# Patient Record
Sex: FEMALE | Race: WHITE | NOT HISPANIC OR LATINO | Employment: FULL TIME | URBAN - METROPOLITAN AREA
[De-identification: names, ages, dates, MRNs, and addresses within clinical notes are randomized per-mention and may not be internally consistent; named-entity substitution may affect disease eponyms.]

---

## 2017-01-03 ENCOUNTER — APPOINTMENT (OUTPATIENT)
Dept: SURGERY | Facility: CLINIC | Age: 32
End: 2017-01-03

## 2017-01-08 ENCOUNTER — ANESTHESIA EVENT (OUTPATIENT)
Dept: SURGERY | Facility: CLINIC | Age: 32
End: 2017-01-08
Payer: COMMERCIAL

## 2017-01-09 ENCOUNTER — ANESTHESIA (OUTPATIENT)
Dept: SURGERY | Facility: CLINIC | Age: 32
End: 2017-01-09
Payer: COMMERCIAL

## 2017-01-09 ENCOUNTER — APPOINTMENT (OUTPATIENT)
Dept: CARDIOLOGY | Facility: CLINIC | Age: 32
End: 2017-01-09
Payer: COMMERCIAL

## 2017-01-09 PROBLEM — I49.3 PVC'S (PREMATURE VENTRICULAR CONTRACTIONS): Status: ACTIVE | Noted: 2017-01-09

## 2017-01-09 PROCEDURE — 25000125 ZZHC RX 250: Performed by: NURSE ANESTHETIST, CERTIFIED REGISTERED

## 2017-01-09 PROCEDURE — 93620 COMP EP EVL R AT VEN PAC&REC: CPT

## 2017-01-09 PROCEDURE — C1730 CATH, EP, 19 OR FEW ELECT: HCPCS

## 2017-01-09 PROCEDURE — 25800025 ZZH RX 258: Performed by: NURSE ANESTHETIST, CERTIFIED REGISTERED

## 2017-01-09 PROCEDURE — 93621 COMP EP EVL L PAC&REC C SINS: CPT

## 2017-01-09 PROCEDURE — 93602 INTRA-ATRIAL RECORDING: CPT

## 2017-01-09 RX ORDER — SODIUM CHLORIDE, SODIUM LACTATE, POTASSIUM CHLORIDE, CALCIUM CHLORIDE 600; 310; 30; 20 MG/100ML; MG/100ML; MG/100ML; MG/100ML
INJECTION, SOLUTION INTRAVENOUS CONTINUOUS PRN
Status: DISCONTINUED | OUTPATIENT
Start: 2017-01-09 | End: 2017-01-09

## 2017-01-09 RX ORDER — FENTANYL CITRATE 50 UG/ML
INJECTION, SOLUTION INTRAMUSCULAR; INTRAVENOUS PRN
Status: DISCONTINUED | OUTPATIENT
Start: 2017-01-09 | End: 2017-01-09

## 2017-01-09 RX ORDER — PROPOFOL 10 MG/ML
INJECTION, EMULSION INTRAVENOUS CONTINUOUS PRN
Status: DISCONTINUED | OUTPATIENT
Start: 2017-01-09 | End: 2017-01-09

## 2017-01-09 RX ORDER — CEFAZOLIN SODIUM 1 G/3ML
INJECTION, POWDER, FOR SOLUTION INTRAMUSCULAR; INTRAVENOUS PRN
Status: DISCONTINUED | OUTPATIENT
Start: 2017-01-09 | End: 2017-01-09

## 2017-01-09 RX ADMIN — PROPOFOL 50 MCG/KG/MIN: 10 INJECTION, EMULSION INTRAVENOUS at 07:55

## 2017-01-09 RX ADMIN — CEFAZOLIN 1 G: 1 INJECTION, POWDER, FOR SOLUTION INTRAMUSCULAR; INTRAVENOUS at 08:18

## 2017-01-09 RX ADMIN — PHENYLEPHRINE HYDROCHLORIDE 100 MCG: 10 INJECTION, SOLUTION INTRAMUSCULAR; INTRAVENOUS; SUBCUTANEOUS at 08:46

## 2017-01-09 RX ADMIN — MIDAZOLAM HYDROCHLORIDE 0.5 MG: 1 INJECTION, SOLUTION INTRAMUSCULAR; INTRAVENOUS at 08:24

## 2017-01-09 RX ADMIN — PHENYLEPHRINE HYDROCHLORIDE 100 MCG: 10 INJECTION, SOLUTION INTRAMUSCULAR; INTRAVENOUS; SUBCUTANEOUS at 09:48

## 2017-01-09 RX ADMIN — MIDAZOLAM HYDROCHLORIDE 2 MG: 1 INJECTION, SOLUTION INTRAMUSCULAR; INTRAVENOUS at 07:44

## 2017-01-09 RX ADMIN — FENTANYL CITRATE 25 MCG: 50 INJECTION, SOLUTION INTRAMUSCULAR; INTRAVENOUS at 08:17

## 2017-01-09 RX ADMIN — MIDAZOLAM HYDROCHLORIDE 1 MG: 1 INJECTION, SOLUTION INTRAMUSCULAR; INTRAVENOUS at 07:57

## 2017-01-09 RX ADMIN — FENTANYL CITRATE 25 MCG: 50 INJECTION, SOLUTION INTRAMUSCULAR; INTRAVENOUS at 08:20

## 2017-01-09 RX ADMIN — PHENYLEPHRINE HYDROCHLORIDE 100 MCG: 10 INJECTION, SOLUTION INTRAMUSCULAR; INTRAVENOUS; SUBCUTANEOUS at 09:34

## 2017-01-09 RX ADMIN — SODIUM CHLORIDE, POTASSIUM CHLORIDE, SODIUM LACTATE AND CALCIUM CHLORIDE: 600; 310; 30; 20 INJECTION, SOLUTION INTRAVENOUS at 07:44

## 2017-01-09 ASSESSMENT — COPD QUESTIONNAIRES: COPD: 0

## 2017-01-09 ASSESSMENT — LIFESTYLE VARIABLES: TOBACCO_USE: 0

## 2017-01-09 NOTE — ANESTHESIA POSTPROCEDURE EVALUATION
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    Anesthesia Type:  MAC    Note:  Anesthesia Post Evaluation    Patient location during evaluation: PACU  Patient participation: Able to fully participate in evaluation  Level of consciousness: awake and alert  Pain management: adequate  Airway patency: patent  Cardiovascular status: acceptable  Respiratory status: acceptable  Hydration status: acceptable  PONV: none     Anesthetic complications: yes  Specific anesthetic complications: corneal abrasion    Comments: Patient complained of Right eye pain in PACU. R eye appeared somewhat reddened and tearing. No foreign body present on visual exam. Eye flushed with normal saline. Injury consistent with corneal abrasion.    Rx erythromycin ointment [id number] for 3 - 5 days. Discussed with patient that normally corneal abrasions resolved in 24 - 72 hours. If worsening pain, vision, new discharge from eye, no improvement after 3 days she should be seen by opthalmology.         Last vitals:  Filed Vitals:    01/09/17 1100 01/09/17 1115 01/09/17 1130   BP: 108/67 105/59 109/63   Pulse:      Temp:      Resp: 14 16 16   SpO2: 100%         Electronically Signed By: Lloyd Edwards MD  January 9, 2017  11:50 AM

## 2017-01-09 NOTE — ANESTHESIA CARE TRANSFER NOTE
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    Anesthesia Type:   MAC     Note:  Airway :Face Mask  Patient transferred to:PACU        Vitals: (Last set prior to Anesthesia Care Transfer)              Electronically Signed By: DESTINEE Grady CRNA  January 9, 2017  10:10 AM

## 2017-01-09 NOTE — ANESTHESIA PREPROCEDURE EVALUATION
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  > 6 hours solids, > 2 hours clear liquids.       Past Surgical History   Procedure Laterality Date     Surgical history of -   1986,age 13 ,age 15     for fallot tetralogyx4     Surgical history of -        eyelid     Cardiac surgery           Anesthesia Evaluation     . Pt has had prior anesthetic. Type: General    No history of anesthetic complications     ROS/MED HX    ENT/Pulmonary:      (-) tobacco use, asthma and COPD   Neurologic:      (-) CVA, TIA and Neuropathy   Cardiovascular:     (+) hypertension----. : . . . :. . 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  Ordering Provider: TONY EVERETT  Overall quality of the study: Good.                                                                        IMPRESSION:  1.  Normal coronary arteries, with normal origins and no CAD.  2.  The pulmonary artery conduit is circumferentially calcified and  stenotic at its distal anastomosis with the PA confluence, measuring  17.2 mm x 8.7 mm at its narrowest segment. The stenotic segment is  approximately 10 mm in length. The PA conduit measures 22.7 mm x 16.3  mm, proximal to the stenotic segment. The PA confluence distal to the  anastomosis measures 21.6 mm x 14.1 mm.    3. There are no coronary arteries in close  proximity to the stenotic  segment of the PA conduit.     5. Please review Radiology report for incidental noncardiac findings  that will follow separately         (-) CAD, irregular heartbeat/palpitations and stent   METS/Exercise Tolerance:  >4 METS   Hematologic:        (-) anemia   Musculoskeletal:         GI/Hepatic:        (-) GERD and liver disease   Renal/Genitourinary:      (-) renal disease   Endo:      (-) Type I DM, Type II DM and thyroid disease   Psychiatric:         Infectious Disease:  - neg infectious disease ROS       Malignancy:         Other:               Physical Exam  Normal systems: cardiovascular, pulmonary and dental    Airway   Mallampati: I  TM distance: >3 FB  Neck ROM: full    Dental     Cardiovascular   Rhythm and rate: regular and normal  (+) murmur       Pulmonary    breath sounds clear to auscultation                    Anesthesia Plan      History & Physical Review  History and physical reviewed and following examination; no interval change.    ASA Status:  3 .        Plan for MAC (with GA backup) with Intravenous induction. Maintenance will be TIVA.  Reason for MAC:  Chronic cardiopulmonary disease (G9)  PONV prophylaxis:  Ondansetron and Ondansetron (or other 5HT-3)       Postoperative Care  Postoperative pain management:  IV analgesics.      Consents  Anesthetic plan, risks, benefits and alternatives discussed with:  Patient..        Lloyd Edwards MD  7:25 AM January 9, 2017                       .

## 2017-01-25 ENCOUNTER — HOSPITAL ENCOUNTER (INPATIENT)
Facility: CLINIC | Age: 32
Setting detail: SURGERY ADMIT
End: 2017-01-25
Attending: THORACIC SURGERY (CARDIOTHORACIC VASCULAR SURGERY) | Admitting: THORACIC SURGERY (CARDIOTHORACIC VASCULAR SURGERY)
Payer: COMMERCIAL

## 2017-02-03 ENCOUNTER — OFFICE VISIT (OUTPATIENT)
Dept: FAMILY MEDICINE | Facility: CLINIC | Age: 32
End: 2017-02-03
Payer: COMMERCIAL

## 2017-02-03 VITALS
OXYGEN SATURATION: 99 % | TEMPERATURE: 98.6 F | HEART RATE: 77 BPM | WEIGHT: 130 LBS | SYSTOLIC BLOOD PRESSURE: 115 MMHG | BODY MASS INDEX: 23.03 KG/M2 | DIASTOLIC BLOOD PRESSURE: 76 MMHG

## 2017-02-03 DIAGNOSIS — R30.0 DYSURIA: Primary | ICD-10-CM

## 2017-02-03 LAB
ALBUMIN UR-MCNC: NEGATIVE MG/DL
APPEARANCE UR: ABNORMAL
BACTERIA #/AREA URNS HPF: ABNORMAL /HPF
BILIRUB UR QL STRIP: NEGATIVE
COLOR UR AUTO: YELLOW
GLUCOSE UR STRIP-MCNC: NEGATIVE MG/DL
HGB UR QL STRIP: ABNORMAL
KETONES UR STRIP-MCNC: NEGATIVE MG/DL
LEUKOCYTE ESTERASE UR QL STRIP: NEGATIVE
NITRATE UR QL: NEGATIVE
NON-SQ EPI CELLS #/AREA URNS LPF: ABNORMAL /LPF
PH UR STRIP: 7 PH (ref 5–7)
RBC #/AREA URNS AUTO: ABNORMAL /HPF (ref 0–2)
SP GR UR STRIP: <=1.005 (ref 1–1.03)
URN SPEC COLLECT METH UR: ABNORMAL
UROBILINOGEN UR STRIP-ACNC: 0.2 EU/DL (ref 0.2–1)
WBC #/AREA URNS AUTO: ABNORMAL /HPF (ref 0–2)

## 2017-02-03 PROCEDURE — 87088 URINE BACTERIA CULTURE: CPT | Performed by: FAMILY MEDICINE

## 2017-02-03 PROCEDURE — 87086 URINE CULTURE/COLONY COUNT: CPT | Performed by: FAMILY MEDICINE

## 2017-02-03 PROCEDURE — 87186 SC STD MICRODIL/AGAR DIL: CPT | Performed by: FAMILY MEDICINE

## 2017-02-03 PROCEDURE — 99213 OFFICE O/P EST LOW 20 MIN: CPT | Performed by: FAMILY MEDICINE

## 2017-02-03 PROCEDURE — 81001 URINALYSIS AUTO W/SCOPE: CPT | Performed by: FAMILY MEDICINE

## 2017-02-03 RX ORDER — CIPROFLOXACIN 250 MG/1
250 TABLET, FILM COATED ORAL 2 TIMES DAILY
Qty: 6 TABLET | Refills: 0 | Status: SHIPPED | OUTPATIENT
Start: 2017-02-03 | End: 2017-07-25

## 2017-02-03 NOTE — Clinical Note
[facility]   [address] [place]  [address]  [phone]                                   February 6, 2017    Lynne Smith  [address]  [facility] [address]        Dear Lynne,    Your urine culture was positive   It is sensitive to the antibiotic I put you on.    If problems call.    Results for orders placed or performed in visit on 02/03/17   UA reflex to Microscopic and Culture   Result Value Ref Range    Color Urine Yellow     Appearance Urine Slightly Cloudy     Glucose Urine Negative NEG mg/dL    Bilirubin Urine Negative NEG    Ketones Urine Negative NEG mg/dL    Specific Gravity Urine <=1.005 1.003 - 1.035    Blood Urine Trace (A) NEG    pH Urine 7.0 5.0 - 7.0 pH    Protein Albumin Urine Negative NEG mg/dL    Urobilinogen Urine 0.2 0.2 - 1.0 EU/dL    Nitrite Urine Negative NEG    Leukocyte Esterase Urine Negative NEG    Source Midstream Urine    Urine Microscopic   Result Value Ref Range    WBC Urine 2-5 (A) 0 - 2 /HPF    RBC Urine O - 2 0 - 2 /HPF    Squamous Epithelial /LPF Urine Few FEW /LPF    Bacteria Urine Moderate (A) NEG /HPF   Urine Culture Aerobic Bacterial   Result Value Ref Range    Specimen Description Midstream Urine     Culture Micro >100,000 colonies/mL Escherichia coli (A)     Micro Report Status FINAL 02/05/2017     Organism: >100,000 colonies/mL Escherichia coli        Susceptibility    >100,000 colonies/ml escherichia coli (maricruz) -  (no method available)     AMPICILLIN 8 Susceptible  ug/mL     CEFAZOLIN Value in next row  ug/mL      <=4 SusceptibleCefazolin MARICRUZ breakpoints are for the treatment of uncomplicated urinary tract infections.  For the treatment of systemic infections, please contact the laboratory for additional testing.     CEFOXITIN Value in next row  ug/mL      <=4 SusceptibleCefazolin MARICRUZ breakpoints are for the treatment of uncomplicated urinary tract infections.  For the treatment of systemic infections,  please contact the laboratory for additional testing.     CEFTAZIDIME Value in next row  ug/mL      <=4 SusceptibleCefazolin IZABELA breakpoints are for the treatment of uncomplicated urinary tract infections.  For the treatment of systemic infections, please contact the laboratory for additional testing.     CEFTRIAXONE Value in next row  ug/mL      <=4 SusceptibleCefazolin IZABELA breakpoints are for the treatment of uncomplicated urinary tract infections.  For the treatment of systemic infections, please contact the laboratory for additional testing.     CIPROFLOXACIN Value in next row  ug/mL      <=4 SusceptibleCefazolin IZABELA breakpoints are for the treatment of uncomplicated urinary tract infections.  For the treatment of systemic infections, please contact the laboratory for additional testing.     GENTAMICIN Value in next row  ug/mL      <=4 SusceptibleCefazolin IZABELA breakpoints are for the treatment of uncomplicated urinary tract infections.  For the treatment of systemic infections, please contact the laboratory for additional testing.     LEVOFLOXACIN Value in next row  ug/mL      <=4 SusceptibleCefazolin IZABELA breakpoints are for the treatment of uncomplicated urinary tract infections.  For the treatment of systemic infections, please contact the laboratory for additional testing.     NITROFURANTOIN Value in next row  ug/mL      <=4 SusceptibleCefazolin IZABELA breakpoints are for the treatment of uncomplicated urinary tract infections.  For the treatment of systemic infections, please contact the laboratory for additional testing.     TOBRAMYCIN Value in next row  ug/mL      <=4 SusceptibleCefazolin IZABELA breakpoints are for the treatment of uncomplicated urinary tract infections.  For the treatment of systemic infections, please contact the laboratory for additional testing.     Trimethoprim/Sulfa Value in next row  ug/mL      <=4 SusceptibleCefazolin IZABELA breakpoints are for the treatment of uncomplicated urinary tract  infections.  For the treatment of systemic infections, please contact the laboratory for additional testing.     AMPICILLIN/SULBACTAM Value in next row  ug/mL      <=4 SusceptibleCefazolin IZABELA breakpoints are for the treatment of uncomplicated urinary tract infections.  For the treatment of systemic infections, please contact the laboratory for additional testing.     Piperacillin/Tazo Value in next row  ug/mL      <=4 SusceptibleCefazolin IZABELA breakpoints are for the treatment of uncomplicated urinary tract infections.  For the treatment of systemic infections, please contact the laboratory for additional testing.     CEFEPIME Value in next row  ug/mL      <=4 SusceptibleCefazolin IZABELA breakpoints are for the treatment of uncomplicated urinary tract infections.  For the treatment of systemic infections, please contact the laboratory for additional testing.       If you have any questions please call the clinic at [phone]    Sincerely,    Italo Arnold MD  bmd

## 2017-02-03 NOTE — PROGRESS NOTES
SUBJECTIVE:                                                    Lynne Smiht is a 31 year old female who presents to clinic today for the following health issues:      URINARY TRACT SYMPTOMS     Onset: x 5 days    Description:   Painful urination (Dysuria): YES  Blood in urine (Hematuria): no   Delay in urine (Hesitency): YES    Intensity: mild    Progression of Symptoms:  worsening    Accompanying Signs & Symptoms:  Fever/chills: YES- on/off  Flank pain YES- Left alittle  Nausea and vomiting: no   Any vaginal symptoms: none, vaginal discharge -Little white, vaginal odor and vaginal itching  Abdominal/Pelvic Pain: no    History:   History of frequent UTI's: no   History of kidney stones: no   Sexually Active: YES  Possibility of pregnancy: No           Possible yeast infection?    Loss of appetite   Feels flushed   Needs to urinate always     Strong urine   Cloudy urine     No vaginal discharge     O: /76  Pulse 77  Temp 98.6  F (37  C) (Oral)  Wt 130 lb (59 kg)  LMP 12/23/2016  SpO2 99%  Breastfeeding? No  BMI 23.03 kg/m2    The abdomen is soft without tenderness, guarding, mass, rebound or organomegaly. Bowel sounds are normal. No CVA tenderness or inguinal adenopathy noted.    Mild suprapubic tenderness      ICD-10-CM    1. Dysuria R30.0 UA reflex to Microscopic and Culture     Urine Microscopic     ciprofloxacin (CIPRO) 250 MG tablet     Urine Culture Aerobic Bacterial     Urine is dilute and will run urine culture

## 2017-02-03 NOTE — MR AVS SNAPSHOT
After Visit Summary   2/3/2017    Lynne Smith    MRN: [id number]           Patient Information     Date Of Birth          1985        Visit Information        Provider Department      2/3/2017 3:20 PM Italo Arnold MD [facility]        Today's Diagnoses     Dysuria    -  1        Follow-ups after your visit        Your next 10 appointments already scheduled     Apr 07, 2017  9:00 AM   PRE-OP with ENRICO Ambrocio   Peds Cardiovascular Surgery ([facility])    [facility]  [address] [place] [id number] [phone]            Apr 07, 2017 10:20 AM   PRE-OP with MD Mao Tineo Cardiovascular Surgery ([facility])    [facility]  [address] [place] [address] [id number] [phone]            Apr 17, 2017   Procedure with Drew Irwin MD   [facility], Same Day Surgery (--)    [address] [place] [address] [id number] [phone]              Who to contact     If you have questions or need follow up information about today's clinic visit or your schedule please contact [facility] directly at [phone].  Normal or non-critical lab and imaging results will be communicated to you by MyChart, letter or phone within 4 business days after the clinic has received the results. If you do not hear from us within 7 days, please contact the clinic through MyChart or phone. If you have a critical or abnormal lab result, we will notify you by phone as soon as possible.  Submit refill requests through Record[employer] or call your pharmacy and they will forward the refill request to us. Please allow 3 business days for your refill to be completed.          Additional Information About Your Visit        [facility] Information     [employer] lets you send messages to your doctor, view your test results, renew your prescriptions, schedule appointments and more. To  "sign up, go to [email or web address] . Click on \"Log in\" on the left side of the screen, which will take you to the Welcome page. Then click on \"Sign up Now\" on the right side of the page.     You will be asked to enter the access code listed below, as well as some personal information. Please follow the directions to create your username and password.     Your access code is: [email or web address]  Expires: 2017  2:05 PM     Your access code will  in 90 days. If you need help or a new code, please call your [facility] clinic or [phone].        Care EveryWhere ID     This is your Care EveryWhere ID. This could be used by other organizations to access your [facility] medical records  [id number]        Your Vitals Were     Pulse Temperature Pulse Oximetry Breastfeeding?          77 98.6  F (37  C) (Oral) 99% No         Blood Pressure from Last 3 Encounters:   17 115/76   17 119/78   16 122/80    Weight from Last 3 Encounters:   17 130 lb (58.968 kg)   17 130 lb 8.2 oz (59.2 kg)   16 130 lb (58.968 kg)              We Performed the Following     UA reflex to Microscopic and Culture     Urine Culture Aerobic Bacterial     Urine Microscopic          Today's Medication Changes          These changes are accurate as of: 2/3/17  4:04 PM.  If you have any questions, ask your nurse or doctor.               Start taking these medicines.        Dose/Directions    ciprofloxacin 250 MG tablet   Commonly known as:  CIPRO   Used for:  Dysuria   Started by:  Italo Arnold MD        Dose:  250 mg   Take 1 tablet (250 mg) by mouth 2 times daily   Quantity:  6 tablet   Refills:  0            Where to get your medicines      These medications were sent to [facility] - [place], [place] - [address]  [address], [facility] [address]     Phone:  [phone]    - ciprofloxacin 250 MG tablet             Primary Care Provider Office Phone # " Fax #    Mel Dias PA-C [phone]       [facility] [address]  [facility] [address]        Thank you!     Thank you for choosing [facility]  for your care. Our goal is always to provide you with excellent care. Hearing back from our patients is one way we can continue to improve our services. Please take a few minutes to complete the written survey that you may receive in the mail after your visit with us. Thank you!             Your Updated Medication List - Protect others around you: Learn how to safely use, store and throw away your medicines at www.disposemymeds.org.          This list is accurate as of: 2/3/17  4:04 PM.  Always use your most recent med list.                   Brand Name Dispense Instructions for use    benazepril 10 MG tablet    LOTENSIN    90 tablet    Take 1 tablet (10 mg) by mouth daily One daily       ciprofloxacin 250 MG tablet    CIPRO    6 tablet    Take 1 tablet (250 mg) by mouth 2 times daily       erythromycin ophthalmic ointment    ROMYCIN    1 Tube    Place into the right eye every 6 hours       MULTIVITAMIN TABS   OR      1 TABLET DAILY       norethindrone 0.35 MG per tablet    EUGENE    84 tablet    Take 1 tablet (0.35 mg) by mouth daily

## 2017-02-03 NOTE — NURSING NOTE
"Chief Complaint   Patient presents with     Fever     Low grade        Initial /76 mmHg  Pulse 77  Temp(Src) 98.6  F (37  C) (Oral)  Wt 130 lb (58.968 kg)  SpO2 99%  Breastfeeding? No Estimated body mass index is 23.03 kg/(m^2) as calculated from the following:    Height as of 1/9/17: 5' 3\" (1.6 m).    Weight as of this encounter: 130 lb (58.968 kg).  BP completed using cuff size: froylan Abdul MA      "

## 2017-02-05 LAB
BACTERIA SPEC CULT: ABNORMAL
MICRO REPORT STATUS: ABNORMAL
MICROORGANISM SPEC CULT: ABNORMAL
SPECIMEN SOURCE: ABNORMAL

## 2017-02-06 ENCOUNTER — TELEPHONE (OUTPATIENT)
Dept: FAMILY MEDICINE | Facility: CLINIC | Age: 32
End: 2017-02-06

## 2017-02-06 NOTE — TELEPHONE ENCOUNTER
Called patient at [phone] (home) in regards to message below from provider. Unable to reach, left a VM to call back to RN triage line.     Fernanda Grover RN  [facility]

## 2017-02-06 NOTE — TELEPHONE ENCOUNTER
Called patient notify her of message below from provider. Patient states she picked up the Cipro and started it on Friday. Her last dose was today. Patient states the Cipro did help and she is not having any lingering UTI symptoms.     Routed to provider as MARIA DEL CARMEN Grover RN  [facility]

## 2017-02-06 NOTE — TELEPHONE ENCOUNTER
Patient had uti (despite fairly normal looking urine ).   The antibiotic I placed her on was correct and she should be better.  Please call to confirm.

## 2017-02-06 NOTE — PROGRESS NOTES
Quick Note:    Your urine culture was positive   It is sensitive to the antibiotic I put you on.   If problems call.  ______

## 2017-04-27 ENCOUNTER — TRANSFERRED RECORDS (OUTPATIENT)
Dept: HEALTH INFORMATION MANAGEMENT | Facility: CLINIC | Age: 32
End: 2017-04-27

## 2017-04-27 LAB
ALBUMIN SERPL-MCNC: 5.1 G/DL
ALP SERPL-CCNC: NORMAL U/L
ALT SERPL-CCNC: NORMAL U/L
ANION GAP SERPL CALCULATED.3IONS-SCNC: NORMAL MMOL/L
AST SERPL-CCNC: 20 U/L
BASOPHILS NFR BLD AUTO: 0.03 %
BILIRUB SERPL-MCNC: NORMAL MG/DL
BUN SERPL-MCNC: 13 MG/DL
CALCIUM SERPL-MCNC: NORMAL MG/DL
CHLORIDE SERPLBLD-SCNC: NORMAL MMOL/L
CHOLEST SERPL-MCNC: 146 MG/DL
CO2 SERPL-SCNC: NORMAL MMOL/L
CREAT SERPL-MCNC: 0.8 MG/DL
EJECTION FRACTION: 58
EOSINOPHIL NFR BLD AUTO: 0.17 %
ERYTHROCYTE [DISTWIDTH] IN BLOOD BY AUTOMATED COUNT: 13.7 %
GFR SERPL CREATININE-BSD FRML MDRD: >60 ML/MIN/1.73M2
GLUCOSE SERPL-MCNC: 91 MG/DL (ref 70–99)
HCT VFR BLD AUTO: 43.9 %
HDLC SERPL-MCNC: 73 MG/DL
HEMOGLOBIN: 14.8 G/DL (ref 11.7–15.7)
LDLC SERPL CALC-MCNC: 58 MG/DL
LYMPHOCYTES NFR BLD AUTO: 2.63 %
MCH RBC QN AUTO: NORMAL PG
MCHC RBC AUTO-ENTMCNC: NORMAL G/DL
MCV RBC AUTO: 84.6 FL
MONOCYTES NFR BLD AUTO: 0.67 %
NEUTROPHILS NFR BLD AUTO: 6.7 %
NONHDLC SERPL-MCNC: 73 MG/DL
PLATELET COUNT - QUEST: 317 10^9/L (ref 150–450)
POTASSIUM SERPL-SCNC: 4.9 MMOL/L
PROT SERPL-MCNC: 7.6 G/DL
RBC # BLD AUTO: NORMAL 10^12/L
SODIUM SERPL-SCNC: 141 MMOL/L
TRIGL SERPL-MCNC: 74 MG/DL
TSH SERPL-ACNC: 2.8 MCU/ML
WBC # BLD AUTO: NORMAL 10^9/L

## 2017-05-04 ENCOUNTER — TRANSFERRED RECORDS (OUTPATIENT)
Dept: HEALTH INFORMATION MANAGEMENT | Facility: CLINIC | Age: 32
End: 2017-05-04

## 2017-05-12 DIAGNOSIS — I10 HYPERTENSION GOAL BP (BLOOD PRESSURE) < 140/90: ICD-10-CM

## 2017-05-12 RX ORDER — BENAZEPRIL HYDROCHLORIDE 10 MG/1
TABLET ORAL
Qty: 90 TABLET | Refills: 1 | Status: SHIPPED | OUTPATIENT
Start: 2017-05-12 | End: 2017-07-25

## 2017-05-12 NOTE — TELEPHONE ENCOUNTER
benazepril (LOTENSIN) 10 MG tablet      Last Written Prescription Date: 11-15-16  Last Fill Quantity: 90, # refills: 1  Last Office Visit with G, P or [facility] prescribing provider: 2-3-17       Potassium   Date Value Ref Range Status   01/09/2017 3.5 3.4 - 5.3 mmol/L Final     Creatinine   Date Value Ref Range Status   01/09/2017 0.64 0.52 - 1.04 mg/dL Final     BP Readings from Last 3 Encounters:   02/03/17 115/76   01/09/17 119/78   12/14/16 122/80

## 2017-05-12 NOTE — TELEPHONE ENCOUNTER
Prescription approved per [facility] Refill Protocol.    Alexa Roche RN  [facility]

## 2017-05-30 ENCOUNTER — CARE COORDINATION (OUTPATIENT)
Dept: CARDIOLOGY | Facility: CLINIC | Age: 32
End: 2017-05-30

## 2017-06-19 ENCOUNTER — TRANSFERRED RECORDS (OUTPATIENT)
Dept: HEALTH INFORMATION MANAGEMENT | Facility: CLINIC | Age: 32
End: 2017-06-19

## 2017-06-23 ENCOUNTER — TRANSFERRED RECORDS (OUTPATIENT)
Dept: HEALTH INFORMATION MANAGEMENT | Facility: CLINIC | Age: 32
End: 2017-06-23

## 2017-06-23 LAB — EJECTION FRACTION: 60

## 2017-06-28 LAB — INR PPP: 1.32 (ref 0.8–1)

## 2017-07-11 ENCOUNTER — CARE COORDINATION (OUTPATIENT)
Dept: CARDIOLOGY | Facility: CLINIC | Age: 32
End: 2017-07-11

## 2017-07-11 DIAGNOSIS — Z95.2 HX OF ARTIFICIAL HEART VALVE REPLACEMENT: Primary | ICD-10-CM

## 2017-07-13 ENCOUNTER — TRANSFERRED RECORDS (OUTPATIENT)
Dept: HEALTH INFORMATION MANAGEMENT | Facility: CLINIC | Age: 32
End: 2017-07-13

## 2017-07-13 LAB
CREAT SERPL-MCNC: 0.7 MG/DL (ref 0.55–1.02)
CREAT SERPL-MCNC: 0.7 MG/DL (ref 0.55–1.02)
GFR SERPL CREATININE-BSD FRML MDRD: 97.6 MIN/HR
GFR SERPL CREATININE-BSD FRML MDRD: 97.6 MIN/HR
GLUCOSE SERPL-MCNC: 105 MG/DL (ref 74–106)
GLUCOSE SERPL-MCNC: 105 MG/DL (ref 74–106)
POTASSIUM SERPL-SCNC: 4.3 MMOL/L (ref 3.5–5.1)
POTASSIUM SERPL-SCNC: 4.3 MMOL/L (ref 3.5–5.1)
TSH SERPL-ACNC: 1.52 MIU/ML (ref 0.35–4.94)

## 2017-07-17 ENCOUNTER — TELEPHONE (OUTPATIENT)
Dept: FAMILY MEDICINE | Facility: CLINIC | Age: 32
End: 2017-07-17

## 2017-07-17 ENCOUNTER — MEDICAL CORRESPONDENCE (OUTPATIENT)
Dept: HEALTH INFORMATION MANAGEMENT | Facility: CLINIC | Age: 32
End: 2017-07-17

## 2017-07-17 NOTE — TELEPHONE ENCOUNTER
Forms received from: [facility]   Phone number listed: [phone]   Fax listed: [phone]  Date received: 7-17-17  Form description: cardiac rehab  Once forms are completed, please return to [facility] via fax.  Is patient requesting to be contacted when forms are completed: n/a  Form placed: provider danni Jeter

## 2017-07-25 ENCOUNTER — OFFICE VISIT (OUTPATIENT)
Dept: FAMILY MEDICINE | Facility: CLINIC | Age: 32
End: 2017-07-25
Payer: COMMERCIAL

## 2017-07-25 VITALS
WEIGHT: 135 LBS | DIASTOLIC BLOOD PRESSURE: 65 MMHG | TEMPERATURE: 98.9 F | BODY MASS INDEX: 23.92 KG/M2 | HEIGHT: 63 IN | OXYGEN SATURATION: 100 % | SYSTOLIC BLOOD PRESSURE: 100 MMHG | HEART RATE: 61 BPM

## 2017-07-25 DIAGNOSIS — D62 ANEMIA DUE TO BLOOD LOSS, ACUTE: Primary | ICD-10-CM

## 2017-07-25 DIAGNOSIS — Z95.2 S/P PULMONARY VALVE REPLACEMENT: ICD-10-CM

## 2017-07-25 DIAGNOSIS — I10 HYPERTENSION GOAL BP (BLOOD PRESSURE) < 140/90: ICD-10-CM

## 2017-07-25 LAB
ERYTHROCYTE [DISTWIDTH] IN BLOOD BY AUTOMATED COUNT: 14 % (ref 10–15)
FERRITIN SERPL-MCNC: 24 NG/ML (ref 12–150)
HCT VFR BLD AUTO: 33.8 % (ref 35–47)
HGB BLD-MCNC: 10.6 G/DL (ref 11.7–15.7)
INR PPP: 1.88 (ref 0.86–1.14)
IRON SATN MFR SERPL: 5 % (ref 15–46)
IRON SERPL-MCNC: 20 UG/DL (ref 35–180)
MCH RBC QN AUTO: 25.7 PG (ref 26.5–33)
MCHC RBC AUTO-ENTMCNC: 31.4 G/DL (ref 31.5–36.5)
MCV RBC AUTO: 82 FL (ref 78–100)
PLATELET # BLD AUTO: 394 10E9/L (ref 150–450)
RBC # BLD AUTO: 4.13 10E12/L (ref 3.8–5.2)
TIBC SERPL-MCNC: 444 UG/DL (ref 240–430)
WBC # BLD AUTO: 9.8 10E9/L (ref 4–11)

## 2017-07-25 PROCEDURE — 85027 COMPLETE CBC AUTOMATED: CPT | Performed by: PHYSICIAN ASSISTANT

## 2017-07-25 PROCEDURE — 82728 ASSAY OF FERRITIN: CPT | Performed by: PHYSICIAN ASSISTANT

## 2017-07-25 PROCEDURE — 99214 OFFICE O/P EST MOD 30 MIN: CPT | Performed by: PHYSICIAN ASSISTANT

## 2017-07-25 PROCEDURE — 85610 PROTHROMBIN TIME: CPT | Performed by: PHYSICIAN ASSISTANT

## 2017-07-25 PROCEDURE — 83550 IRON BINDING TEST: CPT | Performed by: PHYSICIAN ASSISTANT

## 2017-07-25 PROCEDURE — 36415 COLL VENOUS BLD VENIPUNCTURE: CPT | Performed by: PHYSICIAN ASSISTANT

## 2017-07-25 PROCEDURE — 83540 ASSAY OF IRON: CPT | Performed by: PHYSICIAN ASSISTANT

## 2017-07-25 RX ORDER — METOPROLOL TARTRATE 25 MG/1
25 TABLET, FILM COATED ORAL 2 TIMES DAILY
Qty: 180 TABLET | Refills: 1 | Status: SHIPPED | OUTPATIENT
Start: 2017-07-25 | End: 2017-12-20

## 2017-07-25 RX ORDER — OXYCODONE HYDROCHLORIDE 5 MG/1
TABLET ORAL
COMMUNITY
End: 2017-07-25

## 2017-07-25 RX ORDER — POTASSIUM CHLORIDE 750 MG/1
TABLET, EXTENDED RELEASE ORAL
COMMUNITY
Start: 2017-06-24 | End: 2017-07-25

## 2017-07-25 RX ORDER — AMOXICILLIN 250 MG
CAPSULE ORAL
COMMUNITY
End: 2017-08-08

## 2017-07-25 RX ORDER — POLYETHYLENE GLYCOL 3350 17 G/17G
POWDER, FOR SOLUTION ORAL
COMMUNITY
End: 2017-08-08

## 2017-07-25 RX ORDER — METOPROLOL TARTRATE 25 MG/1
25 TABLET, FILM COATED ORAL 2 TIMES DAILY
COMMUNITY
End: 2017-12-20

## 2017-07-25 RX ORDER — ASPIRIN 81 MG/1
81 TABLET, CHEWABLE ORAL DAILY
COMMUNITY

## 2017-07-25 RX ORDER — WARFARIN SODIUM 1 MG/1
TABLET ORAL
COMMUNITY
Start: 2017-07-07 | End: 2017-07-28

## 2017-07-25 RX ORDER — TRAMADOL HYDROCHLORIDE 50 MG/1
TABLET ORAL
Refills: 0 | COMMUNITY
Start: 2017-07-13 | End: 2018-12-28

## 2017-07-25 RX ORDER — FOLIC ACID 0.8 MG
TABLET ORAL DAILY
COMMUNITY
End: 2017-08-08

## 2017-07-25 NOTE — PROGRESS NOTES
SUBJECTIVE:                                                    Lynne Smith is a 32 year old female who presents to clinic today for the following health issues:      Hospital Follow-up Visit:    Hospital/Nursing Home/IP Rehab Facility: [facility]  Date of Admission: 6/19/17  Date of Discharge: 6/24/17  Reason(s) for Admission: Heart surgery. Pulmonary valve was put int            Problems taking medications regularly:  None       Medication changes since discharge: Blood thinner was given       Problems adhering to non-medication therapy:  None    Patient would also like a letter stating that she can go back to work and set up for INR    Summary of hospitalization:  Discharge summary unavailable  Diagnostic Tests/Treatments reviewed.  Follow up needed: Cardiac rehab  Other Healthcare Providers Involved in Patient s Care:         None  Update since discharge: improved.     Post Discharge Medication Reconciliation: discharge medications reconciled, continue medications without change.  Plan of care communicated with patient     Coding guidelines for this visit:  Type of Medical   Decision Making Face-to-Face Visit       within 7 Days of discharge Face-to-Face Visit        within 14 days of discharge   Moderate Complexity [id number]   High Complexity [id number]          Put a pig valve in the pulmonary valve.   Cleaned up scar tissue. Reinforced the patches.   Did a cardiac ablation as well. Were tryign to prevent a defib.   Going back in November.   Has been feeling well. It is hard to take a deep breath. She is using the incetive spirometer.   Sneezing hurts a little. Was anemic for awhile. Still taking- has 10 tablets left. Last checked 3 weeks ago and was improving.   On coumadin for 3 months.   Starting cardio rehab    Back to work on 7/31/17.     Problem list and histories reviewed & adjusted, as indicated.  Additional history: as documented    Reviewed and updated as needed this visit by  "clinical staff  Tobacco  Allergies  Meds  Med Hx  Surg Hx  Fam Hx  Soc Hx      Reviewed and updated as needed this visit by Provider         ROS:  Constitutional, HEENT, cardiovascular, pulmonary, gi and gu systems are negative, except as otherwise noted.      OBJECTIVE:   /65 (BP Location: Left arm, Patient Position: Chair, Cuff Size: Adult Regular)  Pulse 61  Temp 98.9  F (37.2  C) (Oral)  Ht 5' 3.19\" (1.605 m)  Wt 135 lb (61.2 kg)  SpO2 100%  BMI 23.77 kg/m2  Body mass index is 23.77 kg/(m^2).  GENERAL: healthy, alert and no distress  CV: regular rate and rhythm, holosystolic murmur, no S3 or S4, no murmur, click or rub, no peripheral edema     Diagnostic Test Results:  none     ASSESSMENT/PLAN:       ICD-10-CM    1. Anemia due to blood loss, acute D62 Ferritin     Iron and iron binding capacity     CBC with platelets   2. S/P pulmonary valve replacement Z95.2 INR CLINIC REFERRAL     metoprolol (LOPRESSOR) 25 MG tablet     INR   3. Hypertension goal BP (blood pressure) < 140/90 I10    Recheck on anemia to determine how long she will need the iron.   INR today and referral to INR nurse.   Continue with same medications.   Follow with cardiac rehab guidelines.   Requested the discharge summary from [place].     FUTURE APPOINTMENTS:       - Follow-up office 3 months    Mel Dias PA-C  [facility]  "

## 2017-07-25 NOTE — LETTER
[address] [address]  Phone: [phone]  Fax: [phone]    July 25, 2017        Lynne Smith  [address] [address]          To whom it may concern:    RE: Lynne Smith    Patient was seen and treated today at our clinic. Patient can return to work on 7/31/17 without restrictions. However, patient will be participating in cardiac rehabilitation 3 days per week and on these days may need to leave early depending on how she is feeling and her symptoms.     Please contact me for questions or concerns.      Sincerely,        Mel Dias PA-C

## 2017-07-25 NOTE — MR AVS SNAPSHOT
After Visit Summary   7/25/2017    Lynne Smith    MRN: [id number]           Patient Information     Date Of Birth          1985        Visit Information        Provider Department      7/25/2017 11:00 AM Mel Dias PA-C [facility]        Today's Diagnoses     Anemia due to blood loss, acute    -  1    S/P pulmonary valve replacement           Follow-ups after your visit        Additional Services     INR CLINIC REFERRAL       Your provider has referred you to INR Services.    Please be aware that coverage of these services is subject to the terms and limitations of your health insurance plan.  Call member services at your health plan with any benefit or coverage questions.    Indication for Anticoagulation: Bioprosthetic Valve  If nonstandard INR is desired, indicate goal range and explanation: 2-2.5  Expected Duration of Therapy: 3 Months                  Your next 10 appointments already scheduled     Aug 01, 2017  7:20 AM CDT   Anticoagulation Visit with CP ANTI COAG   [facility] ([facility]    [address] [place] [address] [id number] [phone]              Who to contact     If you have questions or need follow up information about today's clinic visit or your schedule please contact [facility] directly at [phone].  Normal or non-critical lab and imaging results will be communicated to you by MyChart, letter or phone within 4 business days after the clinic has received the results. If you do not hear from us within 7 days, please contact the clinic through MyChart or phone. If you have a critical or abnormal lab result, we will notify you by phone as soon as possible.  Submit refill requests through [employer] [facility] or call your pharmacy and they will forward the refill request to us. Please allow 3 business days for your refill to be completed.          Additional  "Information About Your Visit        MyChart Information     [employer] lets you send messages to your doctor, view your test results, renew your prescriptions, schedule appointments and more. To sign up, go to [email or web address] . Click on \"Log in\" on the left side of the screen, which will take you to the Welcome page. Then click on \"Sign up Now\" on the right side of the page.     You will be asked to enter the access code listed below, as well as some personal information. Please follow the directions to create your username and password.     Your access code is: [email or web address]  Expires: 10/23/2017 11:41 AM     Your access code will  in 90 days. If you need help or a new code, please call your [place] clinic or [phone].        Care EveryWhere ID     This is your Care EveryWhere ID. This could be used by other organizations to access your [place] medical records  [id number]        Your Vitals Were     Pulse Temperature Height Pulse Oximetry BMI (Body Mass Index)       61 98.9  F (37.2  C) (Oral) 5' 3.19\" (1.605 m) 100% 23.77 kg/m2        Blood Pressure from Last 3 Encounters:   17 100/65   17 115/76   17 119/78    Weight from Last 3 Encounters:   17 135 lb (61.2 kg)   17 130 lb (59 kg)   17 130 lb 8.2 oz (59.2 kg)              We Performed the Following     CBC with platelets     Ferritin     INR CLINIC REFERRAL     INR     Iron and iron binding capacity          Today's Medication Changes          These changes are accurate as of: 17 11:41 AM.  If you have any questions, ask your nurse or doctor.               These medicines have changed or have updated prescriptions.        Dose/Directions    * metoprolol 25 MG tablet   Commonly known as:  LOPRESSOR   This may have changed:  Another medication with the same name was added. Make sure you understand how and when to take each.   Changed by:  Mel Dias PA-C        Dose:  25 mg   Take 25 mg by mouth 2 " times daily   Refills:  0       * metoprolol 25 MG tablet   Commonly known as:  LOPRESSOR   This may have changed:  You were already taking a medication with the same name, and this prescription was added. Make sure you understand how and when to take each.   Used for:  S/P pulmonary valve replacement   Changed by:  Mel Dias PA-C        Dose:  25 mg   Take 1 tablet (25 mg) by mouth 2 times daily   Quantity:  180 tablet   Refills:  1       * Notice:  This list has 2 medication(s) that are the same as other medications prescribed for you. Read the directions carefully, and ask your doctor or other care provider to review them with you.         Where to get your medicines      These medications were sent to [facility] [place]. \[facility].\"" [address]  [address] [facility] [address]     Phone:  [phone]     metoprolol 25 MG tablet                Primary Care Provider Office Phone # Fax #    Mel Dias PA-C [phone]       [facility] [address] [facility] [address]        Equal Access to Services     [facility]: Hadii aad ku hadasho Soomaali, waaxda luqadaha, qaybta kaalmada adeegyada, waxay ellyn hayjoce berry . So [facility] [phone].    ATENCIÓN: Si habla español, tiene a caldera disposición servicios gratuitos de asistencia lingüística. Llame al [phone].    We comply with applicable federal civil rights laws and [place] laws. We do not discriminate on the basis of race, color, national origin, age, disability sex, sexual orientation or gender identity.            Thank you!     Thank you for choosing [facility]  for your care. Our goal is always to provide you with excellent care. Hearing back from our patients is one way we can continue to improve our services. Please take a few minutes to complete the written survey that you may receive in the mail after your visit  with us. Thank you!             Your Updated Medication List - Protect others around you: Learn how to safely use, store and throw away your medicines at www.disposemymeds.org.          This list is accurate as of: 7/25/17 11:41 AM.  Always use your most recent med list.                   Brand Name Dispense Instructions for use Diagnosis    aspirin 81 MG chewable tablet      Take 81 mg by mouth daily        * metoprolol 25 MG tablet    LOPRESSOR     Take 25 mg by mouth 2 times daily        * metoprolol 25 MG tablet    LOPRESSOR    180 tablet    Take 1 tablet (25 mg) by mouth 2 times daily    S/P pulmonary valve replacement       MULTIVITAMIN TABS   OR      1 TABLET DAILY        norethindrone 0.35 MG per tablet    EUGENE    84 tablet    Take 1 tablet (0.35 mg) by mouth daily    Encounter for other contraceptive management       polyethylene glycol Packet    MIRALAX/GLYCOLAX          RA MAGNESIUM 500 MG Caps   Generic drug:  Magnesium      Take by mouth daily        senna-docusate 8.6-50 MG per tablet    SENOKOT-S;PERICOLACE          traMADol 50 MG tablet    ULTRAM     TAKE ONE TABLET BY MOUTH EVERY 6 HOURS AS NEEDED FOR moderate pain        warfarin 1 MG tablet    COUMADIN     Take 4 mg daily        * Notice:  This list has 2 medication(s) that are the same as other medications prescribed for you. Read the directions carefully, and ask your doctor or other care provider to review them with you.

## 2017-07-25 NOTE — NURSING NOTE
"Chief Complaint   Patient presents with     Surgical Followup     6/19/17 and letter saying she can go back to work and a set up for INR as she is on a blood thinner       Initial /65 (BP Location: Left arm, Patient Position: Chair, Cuff Size: Adult Regular)  Pulse 61  Temp 98.9  F (37.2  C) (Oral)  Ht 5' 3.19\" (1.605 m)  Wt 135 lb (61.2 kg)  SpO2 100%  BMI 23.77 kg/m2 Estimated body mass index is 23.77 kg/(m^2) as calculated from the following:    Height as of this encounter: 5' 3.19\" (1.605 m).    Weight as of this encounter: 135 lb (61.2 kg).  Medication Reconciliation: complete   Rebeca See IVETH Ingram      "

## 2017-07-26 ENCOUNTER — TELEPHONE (OUTPATIENT)
Dept: FAMILY MEDICINE | Facility: CLINIC | Age: 32
End: 2017-07-26

## 2017-07-26 NOTE — TELEPHONE ENCOUNTER
Please call patient.     Her INR is below goal at 1.8. I would like her to increase her warfarin to 5mg on Thursday and Sunday and continue with 4mg all other days and keep her appointment with Jena HUMMEL next week.   She is still anemic as well. Her hemoglobin is increasing, but her iron levels are still low. She should plan on being on her iron for another 3 months and we will recheck then. If she needs a refill we can send that to her pharmacy.   Mel Dias PA-C

## 2017-07-26 NOTE — TELEPHONE ENCOUNTER
Pt understands her med change for warfarin. Pt is taking over the counter Iron 65 mg and that is fine per Mel Dias. Pt notified. Anne Marie Horn RN-BSN.

## 2017-07-28 ENCOUNTER — TELEPHONE (OUTPATIENT)
Dept: FAMILY MEDICINE | Facility: CLINIC | Age: 32
End: 2017-07-28

## 2017-07-28 DIAGNOSIS — Z95.2 S/P PULMONARY VALVE REPLACEMENT: Primary | ICD-10-CM

## 2017-07-28 RX ORDER — WARFARIN SODIUM 1 MG/1
TABLET ORAL
Qty: 120 TABLET | Refills: 1 | Status: SHIPPED | OUTPATIENT
Start: 2017-07-28 | End: 2017-08-21 | Stop reason: DRUGHIGH

## 2017-07-28 NOTE — TELEPHONE ENCOUNTER
Reason for Call:  Medication or medication refill:    Do you use a [facility]?  Name of the pharmacy and phone number for the current request:  [facility]     Name of the medication requested: warfarin (COUMADIN) 1 MG tablet    Other request: patient forgot to as for a refill at 7-25-17 OV.  She will not have enough for the weekend.  Please call when done.    Can we leave a detailed message on this number? YES    Phone number patient can be reached at: Home number on file [phone] (home)    Best Time: any    Call taken on 7/28/2017 at 9:18 AM by Alesia Jeter    warfarin (COUMADIN) 1 MG tablet    Last Written Prescription Date: patient reported 7-7-17  Last Fill Qty: n/a, # refills: n/a  Last Office Visit with [facility], [facility] or [facility] prescribing provider: 7-25-17       Date and Result of Last PT/INR:   Lab Results   Component Value Date    INR 1.88 07/25/2017    INR 1.24 01/09/2017

## 2017-07-28 NOTE — TELEPHONE ENCOUNTER
coumadin    Last Written Prescription Date: 7/7/17  Last Fill Qty: -, # refills: -  Last Office Visit with [facility], [facility] or [facility] prescribing provider: 7/25/17 7/26/17  Note      Please call patient.      Her INR is below goal at 1.8. I would like her to increase her warfarin to 5mg on Thursday and Sunday and continue with 4mg all other days and keep her appointment with Jena HUMMEL next week.   She is still anemic as well. Her hemoglobin is increasing, but her iron levels are still low. She should plan on being on her iron for another 3 months and we will recheck then. If she needs a refill we can send that to her pharmacy.   Mel Dias PA-C          Next INR 8/1/17       Date and Result of Last PT/INR:   Lab Results   Component Value Date    INR 1.88 07/25/2017    INR 1.24 01/09/2017      Discussed with Mel Dias - Prescription approved per Mel.      Emerita Chavez RN  [facility]

## 2017-08-01 DIAGNOSIS — Z95.2 HX OF ARTIFICIAL HEART VALVE REPLACEMENT: ICD-10-CM

## 2017-08-01 LAB — INR PPP: 1.9 (ref 0.86–1.14)

## 2017-08-01 PROCEDURE — 36416 COLLJ CAPILLARY BLOOD SPEC: CPT | Performed by: INTERNAL MEDICINE

## 2017-08-01 PROCEDURE — 85610 PROTHROMBIN TIME: CPT | Performed by: INTERNAL MEDICINE

## 2017-08-02 ENCOUNTER — TELEPHONE (OUTPATIENT)
Dept: FAMILY MEDICINE | Facility: CLINIC | Age: 32
End: 2017-08-02

## 2017-08-02 NOTE — TELEPHONE ENCOUNTER
Called patient relayed below orders, patient repeated orders back correctly.    Emerita Chavez RN  [facility]

## 2017-08-02 NOTE — TELEPHONE ENCOUNTER
Reason for Call:  Other - Anticoagulation    Detailed comments: Patient had her INR drawn at the lab yesterday since INR nurse was not in. Patient has been waiting to find out if she needs to change her medication dose. She has not heard back yet. She asked if a nurse could call her to discuss dosing. Please leave a detailed voicemail if she does not answer.    Phone Number Patient can be reached at: Home number on file [phone] (home)    Best Time: Anytime    Can we leave a detailed message on this number? YES    Call taken on 8/2/2017 at 2:43 PM by Nelly Burciaga

## 2017-08-02 NOTE — TELEPHONE ENCOUNTER
INR = 1.9.    Take extra 4 mg once tonight and then resume same dose. ( 5 mg Thursday Sunday and 4 mg other days)  Re-check in 1 week

## 2017-08-02 NOTE — TELEPHONE ENCOUNTER
Routing to PCP to review and advise.     Emerita Chavez RN  [facility]

## 2017-08-02 NOTE — TELEPHONE ENCOUNTER
Who was/is supposed to follow up on his? Her INR is 1.9, so there is no urgency to this, but someone should get back to the patient with a plan for her. This could wait until tomorrow if need be, but please inform the patient of this.

## 2017-08-08 ENCOUNTER — ANTICOAGULATION THERAPY VISIT (OUTPATIENT)
Dept: NURSING | Facility: CLINIC | Age: 32
End: 2017-08-08
Payer: COMMERCIAL

## 2017-08-08 DIAGNOSIS — Z79.01 LONG-TERM (CURRENT) USE OF ANTICOAGULANTS: ICD-10-CM

## 2017-08-08 DIAGNOSIS — R52 MILD PAIN: Primary | ICD-10-CM

## 2017-08-08 LAB — INR POINT OF CARE: 2 (ref 0.86–1.14)

## 2017-08-08 PROCEDURE — 36416 COLLJ CAPILLARY BLOOD SPEC: CPT

## 2017-08-08 PROCEDURE — 99207 ZZC NO CHARGE NURSE ONLY: CPT

## 2017-08-08 PROCEDURE — 85610 PROTHROMBIN TIME: CPT | Mod: QW

## 2017-08-08 RX ORDER — ACETAMINOPHEN 500 MG
500-1000 TABLET ORAL EVERY 6 HOURS PRN
Qty: 30 TABLET | Refills: 0 | COMMUNITY
Start: 2017-08-08

## 2017-08-08 NOTE — MR AVS SNAPSHOT
Lynne Smith   8/8/2017 7:40 AM   Anticoagulation Therapy Visit    Description:  32 year old female   Provider:  JOSE MANUEL ANTI COAG   Department:  Cp Nurse           INR as of 8/8/2017     Today's INR 2.0      Anticoagulation Summary as of 8/8/2017     INR goal 2.0-3.0   Today's INR 2.0   Full instructions 4 mg on Mon, Wed, Fri; 5 mg all other days   Next INR check 8/15/2017    Indications   Long-term (current) use of anticoagulants [Z79.01] [Z79.01]         Your next Anticoagulation Clinic appointment(s)     Aug 15, 2017  7:20 AM CDT   Anticoagulation Visit with CP ANTI COAG   [facility] ([facility])    [address] [facility] [address] [address] [phone]              Contact Numbers     [facility]  Please call [phone] or [phone]  to cancel and/or reschedule your appointment.   Please call [phone] with any problems or questions regarding your therapy          August 2017 Details    Sun Mon Tue Wed Thu Fri Sat       1               2               3               4               5                 6               7               8      5 mg   See details      9      4 mg         10      5 mg         11      4 mg         12      5 mg           13      5 mg         14      4 mg         15            16               17               18               19                 20               21               22               23               24               25               26                 27               28               29               30               31                  Date Details   08/08 This INR check       Date of next INR:  8/15/2017         How to take your warfarin dose     To take:  4 mg Take 4 of the 1 mg tablets.    To take:  5 mg Take 5 of the 1 mg tablets.

## 2017-08-08 NOTE — PROGRESS NOTES
ANTICOAGULATION INITIAL CLINIC VISIT    Patient Name:  Lynne Smith  Date:  8/8/2017  Referred by: Mel HIRSCH  Contact Type:  Face to Face - this patient is new to INR Clinic today.  She reports she has been on Jantoven since 6-20-17.  She was started at [facility] with her surgery.  She shows me copies of INR results and Janotoven dosing.  She has never been in the INR Target range of 2.0 - 3.0.  Her INR's have been; 1.9, 1.3, 1.5, 1.8, and 1.5.  She reports she is currently not taking Magnesium, Miralax, Senna, Multivitamin since starting Jantoven.  She states her diet is mainly regi lettuce for greens because she adjusted it in the beginning when starting Jantoven.  She was issued 1 MG tablets by [facility].  She has a lot let, therefore we will use them up before changing her to a higher Jantoven strength.  We reviewed teaching materials below:    SUBJECTIVE:  Coumadin education was completed today.  Topics covered include:  -Introduction to coumadin  -Proper Administration  -INR Testing  -Sign/Symptoms of Bleeding  -Signs/Symptoms of Clot Formation or Stroke  -Dietary Intake of Vitamin K  -Drug Interactions  -Anticoagulation Identification (bracelet, necklace or wallet card)  -Future Surgery  -Effects of Alcohol, Tobacco, and Exercise on Coumadin    Coumadin Education Booklet and Coumadin Identification Wallet Card were given to the patient.       Patient Findings     Positives Initiation of therapy (June 20th started at [place])          OBJECTIVE    INR Protime   Date Value Ref Range Status   08/08/2017 2.0 (A) 0.86 - 1.14 Final       ASSESSMENT / PLAN  INR assessment THER    Recheck INR In: 1 WEEK    INR Location Clinic      Anticoagulation Summary as of 8/8/2017     INR goal 2.0-3.0   Today's INR 2.0   Maintenance plan 4 mg (1 mg x 4) on Mon, Wed, Fri; 5 mg (1 mg x 5) all other days   Full instructions 4 mg on Mon, Wed, Fri; 5 mg all other days   Weekly total 32 mg   Plan last modified Jena Soto RN  (8/8/2017)   Next INR check 8/15/2017   Target end date 9/29/2017    Indications   Long-term (current) use of anticoagulants [Z79.01] [Z79.01]         Anticoagulation Episode Summary     INR check location     Preferred lab     Send INR reminders to  ANTICOAG CLINIC    Comments       Anticoagulation Care Providers     Provider Role Specialty Phone number    Mel Dias PA-C  Physician Assistant - Medical [phone]            See the Encounter Report to view Anticoagulation Flowsheet and Dosing Calendar (Go to Encounters tab in chart review, and find the Anticoagulation Therapy Visit)    Dosage adjustment made based on physician directed care plan.    Jena Soto RN

## 2017-08-15 ENCOUNTER — ANTICOAGULATION THERAPY VISIT (OUTPATIENT)
Dept: NURSING | Facility: CLINIC | Age: 32
End: 2017-08-15
Payer: COMMERCIAL

## 2017-08-15 DIAGNOSIS — D64.9 ANEMIA: Primary | ICD-10-CM

## 2017-08-15 DIAGNOSIS — Z95.2 HEART VALVE REPLACED: ICD-10-CM

## 2017-08-15 DIAGNOSIS — Z79.01 LONG-TERM (CURRENT) USE OF ANTICOAGULANTS: ICD-10-CM

## 2017-08-15 LAB — INR POINT OF CARE: 1.7 (ref 0.86–1.14)

## 2017-08-15 PROCEDURE — 36416 COLLJ CAPILLARY BLOOD SPEC: CPT

## 2017-08-15 PROCEDURE — 99207 ZZC NO CHARGE NURSE ONLY: CPT

## 2017-08-15 PROCEDURE — 85610 PROTHROMBIN TIME: CPT | Mod: QW

## 2017-08-15 RX ORDER — FERROUS GLUCONATE 324(38)MG
TABLET ORAL
Qty: 100 TABLET | Refills: 1 | COMMUNITY
Start: 2017-08-15 | End: 2018-12-28

## 2017-08-15 NOTE — PROGRESS NOTES
ANTICOAGULATION FOLLOW-UP CLINIC VISIT    Patient Name:  Lynne Smith  Date:  8/15/2017  Contact Type:  Face to Face    SUBJECTIVE: Patient's INR is sub-therapeutic despite raising jantoven maintenance dose last visit.  Patient reports she started iron pills earlier this month and forgot to mention it last time.  Noted injury below.  Used tylenol the first day, but ice prn.  Denies bleeding or clotting symptoms.  States greens / veg intake was small.  Otherwise no other changes.  We will raise maintenance jantoven dose again this week.     Patient Findings     Positives Inflammation (twisted right ankle friday)           OBJECTIVE    INR Protime   Date Value Ref Range Status   08/15/2017 1.7 (A) 0.86 - 1.14 Final       ASSESSMENT / PLAN  INR assessment SUB    Recheck INR In: 1 WEEK    INR Location Clinic      Anticoagulation Summary as of 8/15/2017     INR goal 2.0-3.0   Today's INR 1.7!   Maintenance plan 5 mg (1 mg x 5) every day   Full instructions 8/15: 7 mg; Otherwise 5 mg every day   Weekly total 35 mg   Plan last modified Jena Soto, RN (8/15/2017)   Next INR check 8/22/2017   Target end date 9/29/2017    Indications   Long-term (current) use of anticoagulants [Z79.01] [Z79.01]  Heart valve replaced [Z95.2]         Anticoagulation Episode Summary     INR check location     Preferred lab     Send INR reminders to  ANTICO CLINIC    Comments       Anticoagulation Care Providers     Provider Role Specialty Phone number    Mel Dias PA-C  Physician Assistant - Medical [phone]            See the Encounter Report to view Anticoagulation Flowsheet and Dosing Calendar (Go to Encounters tab in chart review, and find the Anticoagulation Therapy Visit)    Dosage adjustment made based on physician directed care plan.    Jena Soto, SHAHEED

## 2017-08-15 NOTE — MR AVS SNAPSHOT
Lynne Smith   8/15/2017 7:20 AM   Anticoagulation Therapy Visit    Description:  32 year old female   Provider:  JOSE MANUEL ANTI COAG   Department:  Cp Nurse           INR as of 8/15/2017     Today's INR 1.7!      Anticoagulation Summary as of 8/15/2017     INR goal 2.0-3.0   Today's INR 1.7!   Full instructions 8/15: 7 mg; Otherwise 5 mg every day   Next INR check 8/22/2017    Indications   Long-term (current) use of anticoagulants [Z79.01] [Z79.01]  Heart valve replaced [Z95.2]         Your next Anticoagulation Clinic appointment(s)     Aug 22, 2017  8:40 AM CDT   Anticoagulation Visit with JOSE MANUEL ANTI AMINAH   [facility] ([facility]    [address] [address] [phone]              Contact Numbers     [facility]  Please call [phone] or [phone]  to cancel and/or reschedule your appointment.   Please call [phone] with any problems or questions regarding your therapy          August 2017 Details    Sun Mon Tue Wed Thu Fri Sat       1               2               3               4               5                 6               7               8               9               10               11               12                 13               14               15      7 mg   See details      16      5 mg         17      5 mg         18      5 mg         19      5 mg           20      5 mg         21      5 mg         22            23               24               25               26                 27               28               29               30               31                  Date Details   08/15 This INR check       Date of next INR:  8/22/2017         How to take your warfarin dose     To take:  5 mg Take 5 of the 1 mg tablets.    To take:  7 mg Take 7 of the 1 mg tablets.

## 2017-08-21 ENCOUNTER — ANTICOAGULATION THERAPY VISIT (OUTPATIENT)
Dept: NURSING | Facility: CLINIC | Age: 32
End: 2017-08-21
Payer: COMMERCIAL

## 2017-08-21 DIAGNOSIS — Z79.01 LONG-TERM (CURRENT) USE OF ANTICOAGULANTS: ICD-10-CM

## 2017-08-21 DIAGNOSIS — Z95.2 HEART VALVE REPLACED: ICD-10-CM

## 2017-08-21 LAB — INR POINT OF CARE: 2.1 (ref 0.86–1.14)

## 2017-08-21 PROCEDURE — 85610 PROTHROMBIN TIME: CPT | Mod: QW

## 2017-08-21 PROCEDURE — 99207 ZZC NO CHARGE NURSE ONLY: CPT

## 2017-08-21 PROCEDURE — 36416 COLLJ CAPILLARY BLOOD SPEC: CPT

## 2017-08-21 RX ORDER — WARFARIN SODIUM 5 MG/1
TABLET ORAL
Qty: 120 TABLET | Refills: 1 | Status: SHIPPED | OUTPATIENT
Start: 2017-08-21 | End: 2017-09-26

## 2017-08-21 NOTE — PROGRESS NOTES
ANTICOAGULATION FOLLOW-UP CLINIC VISIT    Patient Name:  Lynne Smith  Date:  8/21/2017  Contact Type:  Face to Face    SUBJECTIVE: Patient denies changes to other medications.  She had the 1mg warfarin tablets and we d/c that today and changed her to 5mg tablets because her dose has increased.  She denies bleeding or clotting symptoms.     Patient Findings     Positives No Problem Findings           OBJECTIVE    INR Protime   Date Value Ref Range Status   08/21/2017 2.1 (A) 0.86 - 1.14 Final       ASSESSMENT / PLAN  INR assessment THER    Recheck INR In: 1 WEEK    INR Location Clinic      Anticoagulation Summary as of 8/21/2017     INR goal 2.0-3.0   Today's INR 2.1   Maintenance plan 7.5 mg (5 mg x 1.5) on Tue; 5 mg (5 mg x 1) all other days   Full instructions 7.5 mg on Tue; 5 mg all other days   Weekly total 37.5 mg   Plan last modified Jena Soto RN (8/21/2017)   Next INR check 8/29/2017   Target end date 9/29/2017    Indications   Long-term (current) use of anticoagulants [Z79.01] [Z79.01]  Heart valve replaced [Z95.2]         Anticoagulation Episode Summary     INR check location     Preferred lab     Send INR reminders to [facility]    Comments       Anticoagulation Care Providers     Provider Role Specialty Phone number    Mel Dias, PA-C  Physician Assistant - Medical [phone]            See the Encounter Report to view Anticoagulation Flowsheet and Dosing Calendar (Go to Encounters tab in chart review, and find the Anticoagulation Therapy Visit)    Dosage adjustment made based on physician directed care plan.    Jena Soto RN

## 2017-08-21 NOTE — MR AVS SNAPSHOT
Lynne Smith   8/21/2017 8:40 AM   Anticoagulation Therapy Visit    Description:  32 year old female   Provider:  JOSE MANUEL ANTI COAG   Department:  Cp Nurse           INR as of 8/21/2017     Today's INR 2.1      Anticoagulation Summary as of 8/21/2017     INR goal 2.0-3.0   Today's INR 2.1   Full instructions 7.5 mg on Tue; 5 mg all other days   Next INR check 8/29/2017    Indications   Long-term (current) use of anticoagulants [Z79.01] [Z79.01]  Heart valve replaced [Z95.2]         Your next Anticoagulation Clinic appointment(s)     Aug 29, 2017  8:00 AM CDT   Anticoagulation Visit with JOSE MANUEL ANTI AMINAH   [facility] ([facility])    [address] [facility] [address] [phone]              Contact Numbers     [facility]  Please call [phone] or [phone]  to cancel and/or reschedule your appointment.   Please call [phone] with any problems or questions regarding your therapy          August 2017 Details    Sun Mon Tue Wed Thu Fri Sat       1               2               3               4               5                 6               7               8               9               10               11               12                 13               14               15               16               17               18               19                 20               21      5 mg   See details      22      7.5 mg         23      5 mg         24      5 mg         25      5 mg         26      5 mg           27      5 mg         28      5 mg         29            30               31                  Date Details   08/21 This INR check       Date of next INR:  8/29/2017         How to take your warfarin dose     To take:  5 mg Take 1 of the 5 mg tablets.    To take:  7.5 mg Take 1.5 of the 5 mg tablets.

## 2017-08-29 ENCOUNTER — ANTICOAGULATION THERAPY VISIT (OUTPATIENT)
Dept: NURSING | Facility: CLINIC | Age: 32
End: 2017-08-29
Payer: COMMERCIAL

## 2017-08-29 DIAGNOSIS — Z95.2 HEART VALVE REPLACED: ICD-10-CM

## 2017-08-29 DIAGNOSIS — Z79.01 LONG-TERM (CURRENT) USE OF ANTICOAGULANTS: ICD-10-CM

## 2017-08-29 LAB — INR POINT OF CARE: 2.2 (ref 0.86–1.14)

## 2017-08-29 PROCEDURE — 85610 PROTHROMBIN TIME: CPT | Mod: QW

## 2017-08-29 PROCEDURE — 99207 ZZC NO CHARGE NURSE ONLY: CPT

## 2017-08-29 PROCEDURE — 36416 COLLJ CAPILLARY BLOOD SPEC: CPT

## 2017-08-29 NOTE — MR AVS SNAPSHOT
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   Please call [phone] with any problems or questions regarding your therapy          August 2017 Details    Sun Mon Tue Wed Thu Fri Sat       1               2               3               4               5                 6               7               8               9               10               11               12                 13               14               15               16               17               18               19                 20               21               22               23               24               25               26                 27               28               29      7.5 mg   See details      30      5 mg         31      5 mg            Date Details   08/29 This INR check               How to take your warfarin dose     To take:  5 mg Take  1 of the 5 mg tablets.    To take:  7.5 mg Take 1.5 of the 5 mg tablets.           September 2017 Details    Sun Mon Tue Wed Thu Fri Sat          1      5 mg         2      5 mg           3      5 mg         4      5 mg         5      7.5 mg         6      5 mg         7      5 mg         8      5 mg         9      5 mg           10      5 mg         11      5 mg         12            13               14               15               16                 17               18               19               20               21               22               23                 24               25               26               27               28               29               30                Date Details   No additional details    Date of next INR:  9/12/2017         How to take your warfarin dose     To take:  5 mg Take 1 of the 5 mg tablets.    To take:  7.5 mg Take 1.5 of the 5 mg tablets.

## 2017-08-29 NOTE — PROGRESS NOTES
ANTICOAGULATION FOLLOW-UP CLINIC VISIT    Patient Name:  Lynne Smith  Date:  8/29/2017  Contact Type:  Face to Face    SUBJECTIVE: Patient reports she has her follow up at the [facility] 9-15-17 where she will get her final report and when to stop her warfarin.  She denies medicine or diet changes.  No symptoms of bleeding or clotting.       Patient Findings     Positives No Problem Findings           OBJECTIVE    INR Protime   Date Value Ref Range Status   08/29/2017 2.2 (A) 0.86 - 1.14 Final       ASSESSMENT / PLAN  INR assessment THER    Recheck INR In: 2 WEEKS    INR Location Clinic      Anticoagulation Summary as of 8/29/2017     INR goal 2.0-3.0   Today's INR 2.2   Maintenance plan 7.5 mg (5 mg x 1.5) on Tue; 5 mg (5 mg x 1) all other days   Full instructions 7.5 mg on Tue; 5 mg all other days   Weekly total 37.5 mg   No change documented Jena Soto RN   Plan last modified Jena Soto RN (8/21/2017)   Next INR check 9/12/2017   Target end date 9/29/2017    Indications   Long-term (current) use of anticoagulants [Z79.01] [Z79.01]  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    Jena Soto RN

## 2017-09-12 ENCOUNTER — ANTICOAGULATION THERAPY VISIT (OUTPATIENT)
Dept: NURSING | Facility: CLINIC | Age: 32
End: 2017-09-12
Payer: COMMERCIAL

## 2017-09-12 DIAGNOSIS — Z79.01 LONG-TERM (CURRENT) USE OF ANTICOAGULANTS: ICD-10-CM

## 2017-09-12 DIAGNOSIS — Z95.2 HEART VALVE REPLACED: ICD-10-CM

## 2017-09-12 LAB — INR POINT OF CARE: 2 (ref 0.86–1.14)

## 2017-09-12 PROCEDURE — 99207 ZZC NO CHARGE NURSE ONLY: CPT

## 2017-09-12 PROCEDURE — 36416 COLLJ CAPILLARY BLOOD SPEC: CPT

## 2017-09-12 PROCEDURE — 85610 PROTHROMBIN TIME: CPT | Mod: QW

## 2017-09-12 NOTE — PROGRESS NOTES
ANTICOAGULATION FOLLOW-UP CLINIC VISIT    Patient Name:  Lynne Smith  Date:  9/12/2017  Contact Type:  Face to Face    SUBJECTIVE: Patient to see Dr Tana Hammond at [facility] this Friday 9-15-17 in follow up to surgery.  Likely discontinue warfarin the end of this month.  Await further orders.     Patient Findings     Positives No Problem Findings           OBJECTIVE    INR Protime   Date Value Ref Range Status   09/12/2017 2.0 (A) 0.86 - 1.14 Final       ASSESSMENT / PLAN  INR assessment THER    Recheck INR In: 2 WEEKS    INR Location Clinic      Anticoagulation Summary as of 9/12/2017     INR goal 2.0-3.0   Today's INR 2.0   Maintenance plan 7.5 mg (5 mg x 1.5) on Tue; 5 mg (5 mg x 1) all other days   Full instructions 7.5 mg on Tue; 5 mg all other days   Weekly total 37.5 mg   No change documented Jena Soto RN   Plan last modified Jena Soto RN (8/21/2017)   Next INR check 9/26/2017   Target end date 9/29/2017    Indications   Long-term (current) use of anticoagulants [Z79.01] [Z79.01]  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    Jena Soto RN

## 2017-09-12 NOTE — MR AVS SNAPSHOT
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   Please call [phone] with any problems or questions regarding your therapy          September 2017 Details    Sun Mon Tue Wed Thu Fri Sat          1               2                 3               4               5               6               7               8               9                 10               11               12      7.5 mg   See details      13      5 mg         14      5 mg         15      5 mg         16      5 mg           17      5 mg         18      5 mg         19      7.5 mg         20      5 mg         21      5 mg         22      5 mg         23      5 mg           24      5 mg         25      5 mg         26            27               28               29               30                Date Details   09/12 This INR check       Date of next INR:  9/26/2017         How to take your warfarin dose     To take:  5 mg Take 1 of the 5 mg tablets.    To take:  7.5 mg Take 1.5 of the 5 mg tablets.

## 2017-11-30 ENCOUNTER — TELEPHONE (OUTPATIENT)
Dept: FAMILY MEDICINE | Facility: CLINIC | Age: 32
End: 2017-11-30

## 2017-11-30 DIAGNOSIS — Z30.8 ENCOUNTER FOR OTHER CONTRACEPTIVE MANAGEMENT: ICD-10-CM

## 2017-12-04 RX ORDER — ACETAMINOPHEN AND CODEINE PHOSPHATE 120; 12 MG/5ML; MG/5ML
1 SOLUTION ORAL DAILY
Qty: 28 TABLET | Refills: 0 | Status: SHIPPED | OUTPATIENT
Start: 2017-12-04 | End: 2017-12-20

## 2017-12-04 NOTE — TELEPHONE ENCOUNTER
"norethindrone      Last Written Prescription Date: 12/14/16  Last Fill Quantity: 84,  # refills: 3   [facility] Office Visit with [facility] or [facility] prescribing provider: 7/25/17    Requested Prescriptions   Pending Prescriptions Disp Refills     norethindrone (EUGENE) 0.35 MG per tablet 84 tablet 3     Sig: Take 1 tablet (0.35 mg) by mouth daily    Contraceptives Protocol Passed    11/30/2017  5:32 PM       Passed - Patient is not a current smoker if age is 35 or older       Passed - Recent or future visit with authorizing provider's specialty    Patient had office visit in the last year or has a visit in the next 30 days with authorizing provider.  See chart review.              Passed - No active pregnancy on record       Passed - No positive pregnancy test in past 12 months        Medication is being filled for 1 time refill only due to:  Patient needs to be seen because due for office visit 2 months ago per plan at July visit.     Encounter routed to team to reach out to patient to schedule.  \"Re-check anemia\".    Adriana Santana RN  [facility]                                                     "

## 2017-12-05 RX ORDER — ACETAMINOPHEN AND CODEINE PHOSPHATE 120; 12 MG/5ML; MG/5ML
SOLUTION ORAL
Qty: 84 TABLET | Refills: 3 | OUTPATIENT
Start: 2017-12-05

## 2017-12-05 NOTE — TELEPHONE ENCOUNTER
"norethindrone      Last Written Prescription Date: 12/4/17  Last Fill Quantity: 28,  # refills: 0   Last Office Visit with [facility], [facility] or [facility] prescribing provider: 7/25/17                                         Next 5 appointments (look out 90 days)     Dec 20, 2017  6:40 AM CST   PHYSICAL with Mel Dias PA-C   [facility]    [address] [place] [id number] [phone]                  Refill just sent yesterday.   \"Refusal\" routed back to pharmacy with note.  Adriana Santana, SHAHEED  [facility]        "

## 2017-12-20 ENCOUNTER — OFFICE VISIT (OUTPATIENT)
Dept: FAMILY MEDICINE | Facility: CLINIC | Age: 32
End: 2017-12-20
Payer: COMMERCIAL

## 2017-12-20 VITALS
TEMPERATURE: 98.4 F | HEIGHT: 63 IN | SYSTOLIC BLOOD PRESSURE: 109 MMHG | DIASTOLIC BLOOD PRESSURE: 73 MMHG | HEART RATE: 77 BPM | WEIGHT: 144 LBS | BODY MASS INDEX: 25.52 KG/M2

## 2017-12-20 DIAGNOSIS — Z95.2 S/P PULMONARY VALVE REPLACEMENT: ICD-10-CM

## 2017-12-20 DIAGNOSIS — Z30.8 ENCOUNTER FOR OTHER CONTRACEPTIVE MANAGEMENT: ICD-10-CM

## 2017-12-20 DIAGNOSIS — I10 HYPERTENSION GOAL BP (BLOOD PRESSURE) < 140/90: ICD-10-CM

## 2017-12-20 DIAGNOSIS — Z00.00 ROUTINE GENERAL MEDICAL EXAMINATION AT A HEALTH CARE FACILITY: Primary | ICD-10-CM

## 2017-12-20 PROCEDURE — 99395 PREV VISIT EST AGE 18-39: CPT | Performed by: PHYSICIAN ASSISTANT

## 2017-12-20 RX ORDER — ACETAMINOPHEN AND CODEINE PHOSPHATE 120; 12 MG/5ML; MG/5ML
1 SOLUTION ORAL DAILY
Qty: 84 TABLET | Refills: 3 | Status: SHIPPED | OUTPATIENT
Start: 2017-12-20 | End: 2018-11-20

## 2017-12-20 RX ORDER — METOPROLOL TARTRATE 25 MG/1
25 TABLET, FILM COATED ORAL 2 TIMES DAILY
Qty: 180 TABLET | Refills: 3 | Status: SHIPPED | OUTPATIENT
Start: 2017-12-20 | End: 2018-12-28

## 2017-12-20 NOTE — PROGRESS NOTES
SUBJECTIVE:   CC: Lynne Smith is an 32 year old woman who presents for preventive health visit.     Physical   Annual:     Getting at least 3 servings of Calcium per day::  Yes    Bi-annual eye exam::  Yes    Dental care twice a year::  Yes    Sleep apnea or symptoms of sleep apnea::  Daytime drowsiness    Taking medications regularly::  Yes    Medication side effects::  None          Got defibrillator put in 11/29/17 at [place]. Labs last checked at [place] and no longer anemic. Finishing her Fergon 1x/week until medicine is gone.     Declined flu shot.     Worried about weight. No peripheral edema, cough or trouble breathing.   Daytime drowsiness - sleeps about 6 hours and has trouble falling asleep. No naps during the day. Discussed life events and changes in the past few months and finding a new sleeping routine.      Check incision, has been over 10 days since surgery  Would like to check some supplements that she wants to know if she can take with her current medications      Today's PHQ-2 Score:   PHQ-2 ( 1999 [employer]) 12/20/2017   Q1: Little interest or pleasure in doing things 0   Q2: Feeling down, depressed or hopeless 0   PHQ-2 Score 0   Q1: Little interest or pleasure in doing things Not at all   Q2: Feeling down, depressed or hopeless Not at all   PHQ-2 Score 0   Some recent data might be hidden     Abuse: Current or Past (Physical, Sexual or Emotional)- No  Do you feel safe in your environment - Yes    Social History   Substance Use Topics     Smoking status: Never Smoker     Smokeless tobacco: Never Used     Alcohol use Yes      Comment: 1x/week if that.     Alcohol Use 12/20/2017   If you drink alcohol, do you typically have greater than 3 drinks per day OR greater than 7 drinks per week?   No   Some recent data might be hidden   No flowsheet data found.    Reviewed orders with patient.  Reviewed health maintenance and updated orders accordingly - Yes    Mammogram not appropriate for this patient  "based on age.    Pertinent mammograms are reviewed under the imaging tab.  History of abnormal Pap smear: NO - age 30- 65 PAP every 3 years recommended    Reviewed and updated as needed this visit by clinical staffTobacco  Allergies  Meds  Med Hx  Surg Hx  Fam Hx  Soc Hx      Reviewed and updated as needed this visit by Provider        Review of Systems  C: NEGATIVE for fever, chills, change in weight  I: NEGATIVE for worrisome rashes, moles or lesions  E: NEGATIVE for vision changes or irritation  ENT: NEGATIVE for ear, mouth and throat problems  R: NEGATIVE for significant cough or SOB  B: NEGATIVE for masses, tenderness or discharge  CV: NEGATIVE for chest pain, palpitations or peripheral edema  GI: NEGATIVE for nausea, abdominal pain, heartburn, or change in bowel habits  : NEGATIVE for unusual urinary or vaginal symptoms. Periods are regular.  M: NEGATIVE for significant arthralgias or myalgia  N: NEGATIVE for weakness, dizziness or paresthesias  P: NEGATIVE for changes in mood or affect     OBJECTIVE:   /73 (Cuff Size: Adult Regular)  Pulse 77  Temp 98.4  F (36.9  C) (Oral)  Ht 5' 3\" (1.6 m)  Wt 144 lb (65.3 kg)  LMP 12/20/2017 (Exact Date)  BMI 25.51 kg/m2  Physical Exam  GENERAL: healthy, alert and no distress  EYES: Eyes grossly normal to inspection, PERRL and conjunctivae and sclerae normal  HENT: ear canals and TM's normal, nose and mouth without ulcers or lesions  NECK: no adenopathy, no asymmetry, masses, or scars and thyroid normal to palpation  RESP: lungs clear to auscultation - no rales, rhonchi or wheezes  BREAST: normal without masses, tenderness or nipple discharge and no palpable axillary masses or adenopathy  CV: Defibrillator present. Holosystolic murmur Regular rate and rhythm.   ABDOMEN: soft, nontender, no hepatosplenomegaly, no masses and bowel sounds normal  MS: no gross musculoskeletal defects noted, no edema  SKIN: no suspicious lesions or rashes. Left chest incision " "from defibrillator placement healing properly. No signs of infection.   NEURO: Normal strength and tone, mentation intact and speech normal  PSYCH: mentation appears normal, affect normal/bright    ASSESSMENT/PLAN:   1. Encounter for other contraceptive management  Discussed getting into a new routine after surgery and restarting exercise once cleared from surgey.   - norethindrone (EUGENE) 0.35 MG per tablet; Take 1 tablet (0.35 mg) by mouth daily  Dispense: 84 tablet; Refill: 3  Defibrillator     2. S/P pulmonary valve replacement  - metoprolol (LOPRESSOR) 25 MG tablet; Take 1 tablet (25 mg) by mouth 2 times daily  Dispense: 180 tablet; Refill: 3    3. Hypertension goal BP (blood pressure) < 140/90  Blood Pressure 109/73 today. Managed through [facility].     COUNSELING:  Reviewed preventive health counseling, as reflected in patient instructions       Regular exercise       Healthy diet/nutrition       Vision screening     reports that she has never smoked. She has never used smokeless tobacco.    Estimated body mass index is 25.51 kg/(m^2) as calculated from the following:    Height as of this encounter: 5' 3\" (1.6 m).    Weight as of this encounter: 144 lb (65.3 kg).   Weight management plan: Discussed healthy diet and exercise guidelines and patient will follow up in next annual physical in clinic to re-evaluate.    Counseling Resources:  ATP IV Guidelines  Pooled Cohorts Equation Calculator  Breast Cancer Risk Calculator  FRAX Risk Assessment  ICSI Preventive Guidelines  Dietary Guidelines for Americans, 2010  USDA's MyPlate  ASA Prophylaxis  Lung CA Screening    FRENCH DuranS  Mel Dias PA-C  [facility]  Answers for HPI/ROS submitted by the patient on 12/20/2017   PHQ-2 Score: 0    "

## 2017-12-20 NOTE — MR AVS SNAPSHOT
"              After Visit Summary   2017    Lynne Smith    MRN: [id number]           Patient Information     Date Of Birth          1985        Visit Information        Provider Department      2017 6:40 AM Mel Dias PA-C [facility]        Today's Diagnoses     Hypertension goal BP (blood pressure) < 140/90    -  1    Encounter for other contraceptive management        S/P pulmonary valve replacement        Heart valve replaced           Follow-ups after your visit        Who to contact     If you have questions or need follow up information about today's clinic visit or your schedule please contact [facility] directly at [phone].  Normal or non-critical lab and imaging results will be communicated to you by MyChart, letter or phone within 4 business days after the clinic has received the results. If you do not hear from us within 7 days, please contact the clinic through MyChart or phone. If you have a critical or abnormal lab result, we will notify you by phone as soon as possible.  Submit refill requests through [employer] or call your pharmacy and they will forward the refill request to us. Please allow 3 business days for your refill to be completed.          Additional Information About Your Visit        MyChart Information     [employer] lets you send messages to your doctor, view your test results, renew your prescriptions, schedule appointments and more. To sign up, go to [email or web address] . Click on \"Log in\" on the left side of the screen, which will take you to the Welcome page. Then click on \"Sign up Now\" on the right side of the page.     You will be asked to enter the access code listed below, as well as some personal information. Please follow the directions to create your username and password.     Your access code is: [email or web address]  Expires: 3/20/2018  7:28 AM     Your access code will  in 90 days. If you need " "help or a new code, please call your [facility] clinic or [phone].        Care EveryWhere ID     This is your Care EveryWhere ID. This could be used by other organizations to access your [facility] medical records  [id number]        Your Vitals Were     Pulse Temperature Height Last Period BMI (Body Mass Index)       77 98.4  F (36.9  C) (Oral) 5' 3\" (1.6 m) 12/20/2017 (Exact Date) 25.51 kg/m2        Blood Pressure from Last 3 Encounters:   12/20/17 109/73   07/25/17 100/65   02/03/17 115/76    Weight from Last 3 Encounters:   12/20/17 144 lb (65.3 kg)   07/25/17 135 lb (61.2 kg)   02/03/17 130 lb (59 kg)              Today, you had the following     No orders found for display         Where to get your medicines      These medications were sent to [facility] [place] - [place], [place] - [address]  [address] [facility] [address]     Phone:  [phone]     metoprolol 25 MG tablet    norethindrone 0.35 MG per tablet          Primary Care Provider Office Phone # Fax #    Mel Dias PA-C [phone]       [address] [facility] [address]        Equal Access to Services     LEONARDA DESAI : Hadii aad ku hadasho Soomaali, waaxda luqadaha, qaybta kaalmada adeegyada, giovani castañeda. So [facility] [phone].    ATENCIÓN: Si habla español, tiene a caldera disposición servicios gratuitos de asistencia lingüística. Llame al [phone].    We comply with applicable federal civil rights laws and [place] laws. We do not discriminate on the basis of race, color, national origin, age, disability, sex, sexual orientation, or gender identity.            Thank you!     Thank you for choosing [facility]  for your care. Our goal is always to provide you with excellent care. Hearing back from our patients is one way we can continue to improve our services. Please take a few minutes to complete the written survey " that you may receive in the mail after your visit with us. Thank you!             Your Updated Medication List - Protect others around you: Learn how to safely use, store and throw away your medicines at www.disposemymeds.org.          This list is accurate as of: 12/20/17  7:28 AM.  Always use your most recent med list.                   Brand Name Dispense Instructions for use Diagnosis    acetaminophen 500 MG tablet    TYLENOL    30 tablet    Take 1-2 tablets (500-1,000 mg) by mouth every 6 hours as needed for mild pain    Mild pain       aspirin 81 MG chewable tablet      Take 81 mg by mouth daily        ferrous gluconate 324 (38 FE) MG tablet    FERGON    100 tablet    Take by mouth daily (with breakfast)    Anemia       metoprolol 25 MG tablet    LOPRESSOR    180 tablet    Take 1 tablet (25 mg) by mouth 2 times daily    S/P pulmonary valve replacement       norethindrone 0.35 MG per tablet    EUGENE    84 tablet    Take 1 tablet (0.35 mg) by mouth daily    Encounter for other contraceptive management       traMADol 50 MG tablet    ULTRAM     TAKE ONE TABLET BY MOUTH EVERY 6 HOURS AS NEEDED FOR moderate pain

## 2018-06-07 ENCOUNTER — ALLIED HEALTH/NURSE VISIT (OUTPATIENT)
Dept: FAMILY MEDICINE | Facility: CLINIC | Age: 33
End: 2018-06-07
Payer: COMMERCIAL

## 2018-06-07 VITALS — SYSTOLIC BLOOD PRESSURE: 118 MMHG | DIASTOLIC BLOOD PRESSURE: 77 MMHG | HEART RATE: 68 BPM

## 2018-06-07 DIAGNOSIS — Z01.30 BP CHECK: Primary | ICD-10-CM

## 2018-06-07 PROCEDURE — 99207 ZZC NO CHARGE NURSE ONLY: CPT | Performed by: PHYSICIAN ASSISTANT

## 2018-06-07 NOTE — PROGRESS NOTES
Lynne Smith is enrolled/participating in the retail pharmacy Blood Pressure Goals Achievement Program (BPGAP).  Lynne Smith was evaluated at [facility] on June 7, 2018 at which time her blood pressure was:    BP Readings from Last 3 Encounters:   06/07/18 118/77   12/20/17 109/73   07/25/17 100/65     Reviewed lifestyle modifications for blood pressure control and reduction: including making healthy food choices, managing weight, getting regular exercise, smoking cessation, reducing alcohol consumption, monitoring blood pressure regularly.     Lynne Smith is not experiencing symptoms.    Follow-Up: BP is at goal of < 140/90mmHg (patient 18+ years of age with or without diabetes).  Recommended follow-up at pharmacy in 6 months.     Recommendation to Provider: BP checked at pharmacy and noted to be at goal of <140/90.   Recommended patient follow-up in 6 months at the pharmacy.    Lynne Smith was evaluated for enrollment into the PGEN study today.    Patient eligible for enrollment:  No  Patient interested in enrollment:  No    Completed by: ~Thank you  Stefanie Hernandez, PharmD  Retail Pharmacist  For [facility]

## 2018-06-07 NOTE — MR AVS SNAPSHOT
"              After Visit Summary   2018    Lynne Smith    MRN: [id number]           Patient Information     Date Of Birth          1985        Visit Information        Provider Department      2018 4:04 PM Mel Dias PA-C [facility]        Today's Diagnoses     BP check    -  1       Follow-ups after your visit        Who to contact     If you have questions or need follow up information about today's clinic visit or your schedule please contact [facility] directly at [phone].  Normal or non-critical lab and imaging results will be communicated to you by [employer], letter or phone within 4 business days after the clinic has received the results. If you do not hear from us within 7 days, please contact the clinic through [employer] or phone. If you have a critical or abnormal lab result, we will notify you by phone as soon as possible.  Submit refill requests through [employer] or call your pharmacy and they will forward the refill request to us. Please allow 3 business days for your refill to be completed.          Additional Information About Your Visit        MyChart Information     [employer] lets you send messages to your doctor, view your test results, renew your prescriptions, schedule appointments and more. To sign up, go to [email or web address] . Click on \"Log in\" on the left side of the screen, which will take you to the Welcome page. Then click on \"Sign up Now\" on the right side of the page.     You will be asked to enter the access code listed below, as well as some personal information. Please follow the directions to create your username and password.     Your access code is: [email or web address]  Expires: 2018  4:06 PM     Your access code will  in 90 days. If you need help or a new code, please call your [facility] or [phone].        Care EveryWhere ID     This is your Care EveryWhere ID. This could be used by other " organizations to access your [facility] medical records  [id number]        Your Vitals Were     Pulse                   68            Blood Pressure from Last 3 Encounters:   06/07/18 118/77   12/20/17 109/73   07/25/17 100/65    Weight from Last 3 Encounters:   12/20/17 144 lb (65.3 kg)   07/25/17 135 lb (61.2 kg)   02/03/17 130 lb (59 kg)              Today, you had the following     No orders found for display       Primary Care Provider Office Phone # Fax #    Mel Dias PA-C [phone]       [address] [facility] [address]        Equal Access to Services     LEONARDA DESAI : Hadii darwin truongo Sopravin, waaxda luqadaha, qaybta kaalmada adegabbyyada, giovani berry . So [facility] [phone].    ATENCIÓN: Si habla español, tiene a caldera disposición servicios gratuitos de asistencia lingüística. Llame al [phone].    We comply with applicable federal civil rights laws and [place] laws. We do not discriminate on the basis of race, color, national origin, age, disability, sex, sexual orientation, or gender identity.            Thank you!     Thank you for choosing [facility]  for your care. Our goal is always to provide you with excellent care. Hearing back from our patients is one way we can continue to improve our services. Please take a few minutes to complete the written survey that you may receive in the mail after your visit with us. Thank you!             Your Updated Medication List - Protect others around you: Learn how to safely use, store and throw away your medicines at www.disposemymeds.org.          This list is accurate as of 6/7/18  4:06 PM.  Always use your most recent med list.                   Brand Name Dispense Instructions for use Diagnosis    acetaminophen 500 MG tablet    TYLENOL    30 tablet    Take 1-2 tablets (500-1,000 mg) by mouth every 6 hours as needed for mild pain    Mild pain       aspirin 81 MG  chewable tablet      Take 81 mg by mouth daily        ferrous gluconate 324 (38 Fe) MG tablet    FERGON    100 tablet    Take by mouth daily (with breakfast)    Anemia       metoprolol tartrate 25 MG tablet    LOPRESSOR    180 tablet    Take 1 tablet (25 mg) by mouth 2 times daily    S/P pulmonary valve replacement       norethindrone 0.35 MG per tablet    EUGENE    84 tablet    Take 1 tablet (0.35 mg) by mouth daily    Encounter for other contraceptive management       traMADol 50 MG tablet    ULTRAM     TAKE ONE TABLET BY MOUTH EVERY 6 HOURS AS NEEDED FOR moderate pain

## 2018-11-07 ENCOUNTER — OFFICE VISIT (OUTPATIENT)
Dept: FAMILY MEDICINE | Facility: CLINIC | Age: 33
End: 2018-11-07
Payer: COMMERCIAL

## 2018-11-07 VITALS
TEMPERATURE: 98.5 F | SYSTOLIC BLOOD PRESSURE: 137 MMHG | DIASTOLIC BLOOD PRESSURE: 88 MMHG | WEIGHT: 155 LBS | BODY MASS INDEX: 27.46 KG/M2 | HEART RATE: 61 BPM

## 2018-11-07 DIAGNOSIS — N30.00 ACUTE CYSTITIS WITHOUT HEMATURIA: Primary | ICD-10-CM

## 2018-11-07 DIAGNOSIS — R39.11 HESITANCY: ICD-10-CM

## 2018-11-07 DIAGNOSIS — R82.90 NONSPECIFIC FINDING ON EXAMINATION OF URINE: ICD-10-CM

## 2018-11-07 LAB
ALBUMIN UR-MCNC: NEGATIVE MG/DL
APPEARANCE UR: CLEAR
BACTERIA #/AREA URNS HPF: ABNORMAL /HPF
BILIRUB UR QL STRIP: NEGATIVE
COLOR UR AUTO: YELLOW
GLUCOSE UR STRIP-MCNC: NEGATIVE MG/DL
HGB UR QL STRIP: ABNORMAL
KETONES UR STRIP-MCNC: NEGATIVE MG/DL
LEUKOCYTE ESTERASE UR QL STRIP: ABNORMAL
MUCOUS THREADS #/AREA URNS LPF: PRESENT /LPF
NITRATE UR QL: NEGATIVE
NON-SQ EPI CELLS #/AREA URNS LPF: ABNORMAL /LPF
PH UR STRIP: 7 PH (ref 5–7)
RBC #/AREA URNS AUTO: ABNORMAL /HPF
SOURCE: ABNORMAL
SP GR UR STRIP: 1.01 (ref 1–1.03)
UROBILINOGEN UR STRIP-ACNC: 0.2 EU/DL (ref 0.2–1)
WBC #/AREA URNS AUTO: ABNORMAL /HPF

## 2018-11-07 PROCEDURE — 87086 URINE CULTURE/COLONY COUNT: CPT | Performed by: PHYSICIAN ASSISTANT

## 2018-11-07 PROCEDURE — 99213 OFFICE O/P EST LOW 20 MIN: CPT | Performed by: PHYSICIAN ASSISTANT

## 2018-11-07 PROCEDURE — 81001 URINALYSIS AUTO W/SCOPE: CPT | Performed by: PHYSICIAN ASSISTANT

## 2018-11-07 RX ORDER — NITROFURANTOIN 25; 75 MG/1; MG/1
100 CAPSULE ORAL 2 TIMES DAILY
Qty: 14 CAPSULE | Refills: 0 | Status: SHIPPED | OUTPATIENT
Start: 2018-11-07 | End: 2018-12-28

## 2018-11-07 NOTE — PROGRESS NOTES
SUBJECTIVE:   Lynne Smith is a 33 year old female who presents to clinic today for the following health issues:      URINARY TRACT SYMPTOMS  Onset: x2 weeks    Description:   Painful urination (Dysuria): no   Blood in urine (Hematuria): no   Delay in urine (Hesitency): YES    Intensity: mild, moderate    Progression of Symptoms:  improving    Accompanying Signs & Symptoms:  Fever/chills: YES- chills  Flank pain YES  Nausea and vomiting: YES- nausea  Any vaginal symptoms:no, slight itchiness   Abdominal/Pelvic Pain: YES- cramping    History:   History of frequent UTI's: no   History of kidney stones: no   Sexually Active: YES-no risk for STD   Possibility of pregnancy: No    Precipitating factors:       Therapies Tried and outcome: Increase fluid intake and OTC advil or tylenol     Started feeling run down 1 month ago. Did get  about 2 weeks ago.   Now 2 weeks ago she started with urine symptoms.Yesterday intensified.  There is a urine odor. Frequent urination, delay in urine.   Has been warm, but no fevers.     Problem list and histories reviewed & adjusted, as indicated.  Additional history: as documented    Patient Active Problem List   Diagnosis     Fallot tetralogy     Acne     CARDIOVASCULAR SCREENING; LDL GOAL LESS THAN 160     Hypertension goal BP (blood pressure) < 140/90     Right ventricular hypertrophy     Right ventricular enlargement     Pulmonary insufficiency     Encounter for other contraceptive management     PVC's (premature ventricular contractions)     S/P pulmonary valve replacement     Long-term (current) use of anticoagulants [Z79.01]     Heart valve replaced     Past Surgical History:   Procedure Laterality Date     CARDIAC SURGERY  11/2017    defibulator placement     CARDIAC SURGERY  2017    pig valve-pulmonary valve     CARDIAC SURGERY  2017    cardiac mesh revision-[place]     SURGICAL HISTORY OF -   1985,age 13 ,age 15    for fallot tetralogyx4     SURGICAL HISTORY OF -        eyelid       Social History   Substance Use Topics     Smoking status: Never Smoker     Smokeless tobacco: Never Used     Alcohol use Yes      Comment: 1x/week if that.     Family History   Problem Relation Age of Onset     Hypertension Mother      Arthritis Mother      Eye Disorder Mother      Hypertension Father      Diabetes Maternal Grandmother      Hypertension Maternal Grandmother      Eye Disorder Maternal Grandmother      Diabetes Maternal Grandfather      Cancer Paternal Grandmother      Asthma Sister      Eye Disorder Sister      Eye Disorder Brother            Reviewed and updated as needed this visit by clinical staff  Tobacco  Allergies  Meds  Problems  Med Hx  Surg Hx  Fam Hx  Soc Hx        Reviewed and updated as needed this visit by Provider  Allergies  Meds  Problems         ROS:  Constitutional, HEENT, cardiovascular, pulmonary, gi and gu systems are negative, except as otherwise noted.    OBJECTIVE:     /88 (BP Location: Left arm, Patient Position: Chair, Cuff Size: Adult Regular)  Pulse 61  Temp 98.5  F (36.9  C) (Oral)  Wt 155 lb (70.3 kg)  LMP 10/26/2018  Breastfeeding? No  BMI 27.46 kg/m2  Body mass index is 27.46 kg/(m^2).  GENERAL: healthy, alert and no distress  ABDOMEN: soft, nontender, no hepatosplenomegaly, no masses and bowel sounds normal  BACK: no CVA tenderness, no paralumbar tenderness    Diagnostic Test Results:      ASSESSMENT/PLAN:       ICD-10-CM    1. Acute cystitis without hematuria N30.00 nitroFURantoin, macrocrystal-monohydrate, (MACROBID) 100 MG capsule   2. Hesitancy R39.11 UA reflex to Microscopic and Culture     Urine Microscopic   3. Nonspecific finding on examination of urine R82.90 Urine Culture Aerobic Bacterial   Will treat with macrobid. Finish all antibiotics. Discussed urinating after intercourse. Send for urine culture.     FUTURE APPOINTMENTS:       - Follow-up for annual visit or as needed    Mel Dias PA-C  [facility]  [facility]

## 2018-11-07 NOTE — MR AVS SNAPSHOT
After Visit Summary   11/7/2018    Lynne Smith    MRN: [id number]           Patient Information     Date Of Birth          1985        Visit Information        Provider Department      11/7/2018 7:20 AM Mel Dias PA-C [facility]        Today's Diagnoses     Hesitancy    -  1    Nonspecific finding on examination of urine        Acute cystitis without hematuria          Care Instructions    You are due for your physical with pap smear when you have time.             Follow-ups after your visit        Follow-up notes from your care team     Return in about 2 months (around 1/7/2019) for Physical Exam.      Who to contact     If you have questions or need follow up information about today's clinic visit or your schedule please contact [facility] directly at [phone].  Normal or non-critical lab and imaging results will be communicated to you by MyChart, letter or phone within 4 business days after the clinic has received the results. If you do not hear from us within 7 days, please contact the clinic through MyChart or phone. If you have a critical or abnormal lab result, we will notify you by phone as soon as possible.  Submit refill requests through [employer] or call your pharmacy and they will forward the refill request to us. Please allow 3 business days for your refill to be completed.          Additional Information About Your Visit        Care EveryWhere ID     This is your Care EveryWhere ID. This could be used by other organizations to access your [facility] medical records  [id number]        Your Vitals Were     Pulse Temperature Last Period Breastfeeding? BMI (Body Mass Index)       61 98.5  F (36.9  C) (Oral) 10/26/2018 No 27.46 kg/m2        Blood Pressure from Last 3 Encounters:   11/07/18 137/88   06/07/18 118/77   12/20/17 109/73    Weight from Last 3 Encounters:   11/07/18 155 lb (70.3 kg)   12/20/17 144 lb (65.3 kg)    07/25/17 135 lb (61.2 kg)              We Performed the Following     UA reflex to Microscopic and Culture     Urine Culture Aerobic Bacterial     Urine Microscopic          Today's Medication Changes          These changes are accurate as of 11/7/18  8:07 AM.  If you have any questions, ask your nurse or doctor.               Start taking these medicines.        Dose/Directions    nitroFURantoin (macrocrystal-monohydrate) 100 MG capsule   Commonly known as:  MACROBID   Used for:  Acute cystitis without hematuria   Started by:  Mel Dias PA-C        Dose:  100 mg   Take 1 capsule (100 mg) by mouth 2 times daily   Quantity:  14 capsule   Refills:  0            Where to get your medicines      These medications were sent to [facility] [address]  [address] [facility] [address]     Phone:  [phone]     nitroFURantoin (macrocrystal-monohydrate) 100 MG capsule                Primary Care Provider Office Phone # Fax #    Mel Dias PA-C [phone]       [address] [facility] [address]        Equal Access to Services     [facility]A [facility] : Hadii aad ku hadasho Soomaali, waaxda luqadaha, qaybta kaalmada adeegyada, giovani berry . So [facility] [phone].    ATENCIÓN: Si habla español, tiene a caldera disposición servicios gratuitos de asistencia lingüística. Acaciaame al [phone].    We comply with applicable federal civil rights laws and [place] laws. We do not discriminate on the basis of race, color, national origin, age, disability, sex, sexual orientation, or gender identity.            Thank you!     Thank you for choosing [facility]  for your care. Our goal is always to provide you with excellent care. Hearing back from our patients is one way we can continue to improve our services. Please take a few minutes to complete the written survey that you may  receive in the mail after your visit with us. Thank you!             Your Updated Medication List - Protect others around you: Learn how to safely use, store and throw away your medicines at www.disposemymeds.org.          This list is accurate as of 11/7/18  8:07 AM.  Always use your most recent med list.                   Brand Name Dispense Instructions for use Diagnosis    acetaminophen 500 MG tablet    TYLENOL    30 tablet    Take 1-2 tablets (500-1,000 mg) by mouth every 6 hours as needed for mild pain    Mild pain       aspirin 81 MG chewable tablet      Take 81 mg by mouth daily        ferrous gluconate 324 (38 Fe) MG tablet    FERGON    100 tablet    Take by mouth daily (with breakfast)    Anemia       metoprolol tartrate 25 MG tablet    LOPRESSOR    180 tablet    Take 1 tablet (25 mg) by mouth 2 times daily    S/P pulmonary valve replacement       nitroFURantoin (macrocrystal-monohydrate) 100 MG capsule    MACROBID    14 capsule    Take 1 capsule (100 mg) by mouth 2 times daily    Acute cystitis without hematuria       norethindrone 0.35 MG per tablet    EUGENE    84 tablet    Take 1 tablet (0.35 mg) by mouth daily    Encounter for other contraceptive management       traMADol 50 MG tablet    ULTRAM     TAKE ONE TABLET BY MOUTH EVERY 6 HOURS AS NEEDED FOR moderate pain

## 2018-11-07 NOTE — LETTER
[facility]   [address] [place]  [address]  [phone]                                   November 8, 2018    Lynne Smith  [address]  [place] [address]        Dear Lynne,    Your urine culture did not show any bacteria. If you are still having symptoms after taking the antibiotics I would recommend a follow up to do a vaginal exam.     Results for orders placed or performed in visit on 11/07/18   UA reflex to Microscopic and Culture   Result Value Ref Range    Color Urine Yellow     Appearance Urine Clear     Glucose Urine Negative NEG^Negative mg/dL    Bilirubin Urine Negative NEG^Negative    Ketones Urine Negative NEG^Negative mg/dL    Specific Gravity Urine 1.015 1.003 - 1.035    Blood Urine Small (A) NEG^Negative    pH Urine 7.0 5.0 - 7.0 pH    Protein Albumin Urine Negative NEG^Negative mg/dL    Urobilinogen Urine 0.2 0.2 - 1.0 EU/dL    Nitrite Urine Negative NEG^Negative    Leukocyte Esterase Urine Small (A) NEG^Negative    Source Midstream Urine    Urine Microscopic   Result Value Ref Range    WBC Urine 10-25 (A) OTO5^0 - 5 /HPF    RBC Urine 2-5 (A) OTO2^O - 2 /HPF    Squamous Epithelial /LPF Urine Few FEW^Few /LPF    Bacteria Urine Moderate (A) NEG^Negative /HPF    Mucous Urine Present (A) NEG^Negative /LPF   Urine Culture Aerobic Bacterial   Result Value Ref Range    Specimen Description Midstream Urine     Culture Micro No growth        If you have any questions please call the clinic at [phone]    Sincerely,    Mel Dias PA-C  [facility]

## 2018-11-08 LAB
BACTERIA SPEC CULT: NO GROWTH
SPECIMEN SOURCE: NORMAL

## 2018-11-08 NOTE — PROGRESS NOTES
Lynne,     Your urine culture did not show any bacteria. If you are still having symptoms after taking the antibiotics I would recommend a follow up to do a vaginal exam.   Mel Dias PA-C

## 2018-11-20 DIAGNOSIS — Z30.8 ENCOUNTER FOR OTHER CONTRACEPTIVE MANAGEMENT: ICD-10-CM

## 2018-11-20 RX ORDER — ACETAMINOPHEN AND CODEINE PHOSPHATE 120; 12 MG/5ML; MG/5ML
SOLUTION ORAL
Qty: 84 TABLET | Refills: 3 | Status: SHIPPED | OUTPATIENT
Start: 2018-11-20 | End: 2018-12-28

## 2018-11-20 NOTE — TELEPHONE ENCOUNTER
"Requested Prescriptions   Pending Prescriptions Disp Refills     norethindrone (MICRONOR) 0.35 MG per tablet [Pharmacy Med Name: NORETHINDRONE 0.35MG TABS] 84 tablet 3     Sig: TAKE ONE TABLET BY MOUTH EVERY DAY    Last Written Prescription Date:  12/20/17  Last Fill Quantity: 84,  # refills: 3  Last office visit: 11/7/2018 with prescribing provider:    Future Office Visit:        Contraceptives Protocol Passed    11/20/2018  9:33 AM       Passed - Patient is not a current smoker if age is 35 or older       Passed - Recent (12 mo) or future (30 days) visit within the authorizing provider's specialty    Patient had office visit in the last 12 months or has a visit in the next 30 days with authorizing provider or within the authorizing provider's specialty.  See \"Patient Info\" tab in inbasket, or \"Choose Columns\" in Meds & Orders section of the refill encounter.             Passed - No active pregnancy on record       Passed - No positive pregnancy test in past 12 months          "

## 2018-11-20 NOTE — TELEPHONE ENCOUNTER
Prescription approved per [facility] Refill Protocol.  Barbara Hines RN

## 2018-12-28 ENCOUNTER — OFFICE VISIT (OUTPATIENT)
Dept: FAMILY MEDICINE | Facility: CLINIC | Age: 33
End: 2018-12-28
Payer: COMMERCIAL

## 2018-12-28 VITALS
BODY MASS INDEX: 27.82 KG/M2 | HEIGHT: 63 IN | SYSTOLIC BLOOD PRESSURE: 128 MMHG | OXYGEN SATURATION: 99 % | DIASTOLIC BLOOD PRESSURE: 85 MMHG | HEART RATE: 60 BPM | WEIGHT: 157 LBS | TEMPERATURE: 98.1 F

## 2018-12-28 DIAGNOSIS — M65.4 RADIAL STYLOID TENOSYNOVITIS OF RIGHT HAND: ICD-10-CM

## 2018-12-28 DIAGNOSIS — R53.83 OTHER FATIGUE: ICD-10-CM

## 2018-12-28 DIAGNOSIS — I10 HYPERTENSION GOAL BP (BLOOD PRESSURE) < 140/90: ICD-10-CM

## 2018-12-28 DIAGNOSIS — Z12.4 SCREENING FOR MALIGNANT NEOPLASM OF CERVIX: ICD-10-CM

## 2018-12-28 DIAGNOSIS — Z95.2 HEART VALVE REPLACED: ICD-10-CM

## 2018-12-28 DIAGNOSIS — Q21.3 FALLOT TETRALOGY: ICD-10-CM

## 2018-12-28 DIAGNOSIS — Z30.8 ENCOUNTER FOR OTHER CONTRACEPTIVE MANAGEMENT: ICD-10-CM

## 2018-12-28 DIAGNOSIS — Z00.00 ROUTINE GENERAL MEDICAL EXAMINATION AT A HEALTH CARE FACILITY: Primary | ICD-10-CM

## 2018-12-28 DIAGNOSIS — M65.4 RADIAL STYLOID TENOSYNOVITIS OF LEFT HAND: ICD-10-CM

## 2018-12-28 DIAGNOSIS — Z95.2 S/P PULMONARY VALVE REPLACEMENT: ICD-10-CM

## 2018-12-28 DIAGNOSIS — Z11.4 SCREENING FOR HIV (HUMAN IMMUNODEFICIENCY VIRUS): ICD-10-CM

## 2018-12-28 LAB
ALBUMIN SERPL-MCNC: 4.1 G/DL (ref 3.4–5)
ALP SERPL-CCNC: 68 U/L (ref 40–150)
ALT SERPL W P-5'-P-CCNC: 27 U/L (ref 0–50)
ANION GAP SERPL CALCULATED.3IONS-SCNC: 7 MMOL/L (ref 3–14)
AST SERPL W P-5'-P-CCNC: 16 U/L (ref 0–45)
BILIRUB SERPL-MCNC: 1.8 MG/DL (ref 0.2–1.3)
BUN SERPL-MCNC: 12 MG/DL (ref 7–30)
CALCIUM SERPL-MCNC: 8.7 MG/DL (ref 8.5–10.1)
CHLORIDE SERPL-SCNC: 103 MMOL/L (ref 94–109)
CHOLEST SERPL-MCNC: 149 MG/DL
CO2 SERPL-SCNC: 27 MMOL/L (ref 20–32)
CREAT SERPL-MCNC: 0.66 MG/DL (ref 0.52–1.04)
ERYTHROCYTE [DISTWIDTH] IN BLOOD BY AUTOMATED COUNT: 13.1 % (ref 10–15)
FERRITIN SERPL-MCNC: 47 NG/ML (ref 12–150)
GFR SERPL CREATININE-BSD FRML MDRD: >90 ML/MIN/{1.73_M2}
GLUCOSE SERPL-MCNC: 84 MG/DL (ref 70–99)
HCT VFR BLD AUTO: 42.8 % (ref 35–47)
HDLC SERPL-MCNC: 53 MG/DL
HGB BLD-MCNC: 14.4 G/DL (ref 11.7–15.7)
IRON SATN MFR SERPL: 35 % (ref 15–46)
IRON SERPL-MCNC: 137 UG/DL (ref 35–180)
LDLC SERPL CALC-MCNC: 82 MG/DL
MCH RBC QN AUTO: 28.9 PG (ref 26.5–33)
MCHC RBC AUTO-ENTMCNC: 33.6 G/DL (ref 31.5–36.5)
MCV RBC AUTO: 86 FL (ref 78–100)
NONHDLC SERPL-MCNC: 96 MG/DL
PLATELET # BLD AUTO: 258 10E9/L (ref 150–450)
POTASSIUM SERPL-SCNC: 3.8 MMOL/L (ref 3.4–5.3)
PROT SERPL-MCNC: 7.4 G/DL (ref 6.8–8.8)
RBC # BLD AUTO: 4.99 10E12/L (ref 3.8–5.2)
SODIUM SERPL-SCNC: 137 MMOL/L (ref 133–144)
SPECIMEN SOURCE: NORMAL
TIBC SERPL-MCNC: 387 UG/DL (ref 240–430)
TRIGL SERPL-MCNC: 71 MG/DL
TSH SERPL DL<=0.005 MIU/L-ACNC: 1.47 MU/L (ref 0.4–4)
VIT B12 SERPL-MCNC: 324 PG/ML (ref 193–986)
WBC # BLD AUTO: 9.1 10E9/L (ref 4–11)
WET PREP SPEC: NORMAL

## 2018-12-28 PROCEDURE — 90471 IMMUNIZATION ADMIN: CPT | Performed by: PHYSICIAN ASSISTANT

## 2018-12-28 PROCEDURE — 87624 HPV HI-RISK TYP POOLED RSLT: CPT | Performed by: PHYSICIAN ASSISTANT

## 2018-12-28 PROCEDURE — 83550 IRON BINDING TEST: CPT | Performed by: PHYSICIAN ASSISTANT

## 2018-12-28 PROCEDURE — 83540 ASSAY OF IRON: CPT | Performed by: PHYSICIAN ASSISTANT

## 2018-12-28 PROCEDURE — 90715 TDAP VACCINE 7 YRS/> IM: CPT | Performed by: PHYSICIAN ASSISTANT

## 2018-12-28 PROCEDURE — G0145 SCR C/V CYTO,THINLAYER,RESCR: HCPCS | Performed by: PHYSICIAN ASSISTANT

## 2018-12-28 PROCEDURE — 80061 LIPID PANEL: CPT | Performed by: PHYSICIAN ASSISTANT

## 2018-12-28 PROCEDURE — 36415 COLL VENOUS BLD VENIPUNCTURE: CPT | Performed by: PHYSICIAN ASSISTANT

## 2018-12-28 PROCEDURE — 80053 COMPREHEN METABOLIC PANEL: CPT | Performed by: PHYSICIAN ASSISTANT

## 2018-12-28 PROCEDURE — 82728 ASSAY OF FERRITIN: CPT | Performed by: PHYSICIAN ASSISTANT

## 2018-12-28 PROCEDURE — 82607 VITAMIN B-12: CPT | Performed by: PHYSICIAN ASSISTANT

## 2018-12-28 PROCEDURE — 99213 OFFICE O/P EST LOW 20 MIN: CPT | Mod: 25 | Performed by: PHYSICIAN ASSISTANT

## 2018-12-28 PROCEDURE — 99395 PREV VISIT EST AGE 18-39: CPT | Mod: 25 | Performed by: PHYSICIAN ASSISTANT

## 2018-12-28 PROCEDURE — 87210 SMEAR WET MOUNT SALINE/INK: CPT | Performed by: PHYSICIAN ASSISTANT

## 2018-12-28 PROCEDURE — 85027 COMPLETE CBC AUTOMATED: CPT | Performed by: PHYSICIAN ASSISTANT

## 2018-12-28 PROCEDURE — 84443 ASSAY THYROID STIM HORMONE: CPT | Performed by: PHYSICIAN ASSISTANT

## 2018-12-28 RX ORDER — ACETAMINOPHEN AND CODEINE PHOSPHATE 120; 12 MG/5ML; MG/5ML
0.35 SOLUTION ORAL DAILY
Qty: 84 TABLET | Refills: 3 | Status: SHIPPED | OUTPATIENT
Start: 2018-12-28 | End: 2020-02-07

## 2018-12-28 RX ORDER — METOPROLOL TARTRATE 25 MG/1
25 TABLET, FILM COATED ORAL 2 TIMES DAILY
Qty: 180 TABLET | Refills: 3 | Status: SHIPPED | OUTPATIENT
Start: 2018-12-28 | End: 2020-02-07

## 2018-12-28 ASSESSMENT — ENCOUNTER SYMPTOMS
HEARTBURN: 0
PALPITATIONS: 0
NERVOUS/ANXIOUS: 0
FEVER: 0
JOINT SWELLING: 0
HEMATOCHEZIA: 0
SHORTNESS OF BREATH: 0
DIARRHEA: 0
COUGH: 0
CONSTIPATION: 0
HEADACHES: 0
HEMATURIA: 0
DYSURIA: 0
ARTHRALGIAS: 1
ABDOMINAL PAIN: 0
PARESTHESIAS: 0
DIZZINESS: 1
WEAKNESS: 1
BREAST MASS: 0
EYE PAIN: 0
MYALGIAS: 1
CHILLS: 0
SORE THROAT: 0
NAUSEA: 0
FREQUENCY: 0

## 2018-12-28 ASSESSMENT — MIFFLIN-ST. JEOR: SCORE: 1392.4

## 2018-12-28 NOTE — PROGRESS NOTES
Prior to injection, verified patient identity using patient's name and date of birth.  Due to injection administration, patient instructed to remain in clinic for 15 minutes  afterwards, and to report any adverse reaction to me immediately.    DUANE Muñoz MA      Drug Amount Wasted:  None.  Vial/Syringe: Single dose vial     DUANE Muñoz MA    VIS for Tdap given on same date of administration.  Staff signature/Title: DUANE Muñoz MA

## 2018-12-28 NOTE — PROGRESS NOTES
sera   SUBJECTIVE:   CC: Lynne Smith is an 33 year old woman who presents for preventive health visit.     Physical   Annual:     Getting at least 3 servings of Calcium per day:  NO    Bi-annual eye exam:  Yes    Dental care twice a year:  Yes    Sleep apnea or symptoms of sleep apnea:  None and Daytime drowsiness    Diet:  Low salt and Other    Frequency of exercise:  1 day/week    Duration of exercise:  Less than 15 minutes    Taking medications regularly:  Yes    Medication side effects:  None    Additional concerns today:  Yes    PHQ-2 Total Score: 0      Pain in her wrists both arms started in October.  Does do typing at work. It feels stressed inside the wrists. Has tried a lot of over the counter meds and symptom care but not helpful.   Weakness-heavy things are hard. No numbness/tingling. Pain is worst when she first wakes up. Patient is right hand dominant. The left is worst.     She has been very tired. No matter how much she sleeps. Even with caffeine she is tried. Has been since the fall. Has been really busy. Going upstairs causes some new shortness of breath. Does not see cardiology again until June.   Not taking iron any longer.       Today's PHQ-2 Score:   PHQ-2 ( 1999 Pfizer) 12/28/2018   Q1: Little interest or pleasure in doing things 0   Q2: Feeling down, depressed or hopeless 0   PHQ-2 Score 0   Q1: Little interest or pleasure in doing things Not at all   Q2: Feeling down, depressed or hopeless Not at all   PHQ-2 Score 0       Abuse: Current or Past(Physical, Sexual or Emotional)- No  Do you feel safe in your environment? Yes    Social History     Tobacco Use     Smoking status: Never Smoker     Smokeless tobacco: Never Used   Substance Use Topics     Alcohol use: Yes     Comment: 1x/week if that.     Alcohol Use 12/28/2018   If you drink alcohol do you typically have greater than 3 drinks per day OR greater than 7 drinks per week? Not Applicable     Reviewed orders with patient.  Reviewed  "health maintenance and updated orders accordingly - Yes  Labs reviewed in EPIC  BP Readings from Last 3 Encounters:   12/28/18 128/85   11/07/18 137/88   06/07/18 118/77    Wt Readings from Last 3 Encounters:   12/28/18 71.2 kg (157 lb)   11/07/18 70.3 kg (155 lb)   12/20/17 65.3 kg (144 lb)                    Mammogram not appropriate for this patient based on age.    Pertinent mammograms are reviewed under the imaging tab.  History of abnormal Pap smear: NO - age 30-65 PAP every 5 years with negative HPV co-testing recommended  PAP / HPV 11/20/2015 8/29/2011 8/12/2010   PAP NIL NIL NIL     Reviewed and updated as needed this visit by clinical staff  Tobacco  Allergies  Meds  Med Hx  Surg Hx  Fam Hx  Soc Hx        Reviewed and updated as needed this visit by Provider            Review of Systems   Constitutional: Negative for chills and fever.   HENT: Negative for congestion, ear pain, hearing loss and sore throat.    Eyes: Negative for pain and visual disturbance.   Respiratory: Negative for cough and shortness of breath.    Cardiovascular: Negative for chest pain, palpitations and peripheral edema.   Gastrointestinal: Negative for abdominal pain, constipation, diarrhea, heartburn, hematochezia and nausea.   Breasts:  Negative for tenderness, breast mass and discharge.   Genitourinary: Negative for dysuria, frequency, genital sores, hematuria, pelvic pain, urgency, vaginal bleeding and vaginal discharge.   Musculoskeletal: Positive for arthralgias and myalgias. Negative for joint swelling.   Skin: Negative for rash.   Neurological: Positive for dizziness and weakness. Negative for headaches and paresthesias.   Psychiatric/Behavioral: Negative for mood changes. The patient is not nervous/anxious.         OBJECTIVE:   /85 (BP Location: Left arm, Patient Position: Chair, Cuff Size: Adult Regular)   Pulse 60   Temp 98.1  F (36.7  C) (Oral)   Ht 1.602 m (5' 3.07\")   Wt 71.2 kg (157 lb)   LMP 12/10/2018 "   BMI 27.75 kg/m    Physical Exam  GENERAL: healthy, alert and no distress  EYES: Eyes grossly normal to inspection, PERRL and conjunctivae and sclerae normal  HENT: ear canals and TM's normal, nose and mouth without ulcers or lesions  NECK: no adenopathy, no asymmetry, masses, or scars and thyroid normal to palpation  RESP: lungs clear to auscultation - no rales, rhonchi or wheezes  BREAST: normal without masses, tenderness or nipple discharge and no palpable axillary masses or adenopathy  CV: regular rate and rhythm, normal S1 S2, no S3 or S4, no murmur, click or rub, no peripheral edema and peripheral pulses strong  ABDOMEN: soft, nontender, no hepatosplenomegaly, no masses and bowel sounds normal   (female): normal female external genitalia, normal urethral meatus, vaginal mucosa pink, moist, well rugated, and normal cervix/adnexa/uterus without masses or discharge  MS: no gross musculoskeletal defects noted, no edema- Pain with finkelstein's test bilaterally. Negative Tinel's. Pain with palpation of the radial tendon bilaterally.   SKIN: no suspicious lesions or rashes  NEURO: Normal strength and tone, mentation intact and speech normal  PSYCH: mentation appears normal, affect normal/bright    Diagnostic Test Results:  none     ASSESSMENT/PLAN:       ICD-10-CM    1. Routine general medical examination at a health care facility Z00.00 Wet prep   2. Encounter for other contraceptive management Z30.8 norethindrone (MICRONOR) 0.35 MG tablet   3. S/P pulmonary valve replacement Z95.2 metoprolol tartrate (LOPRESSOR) 25 MG tablet   4. Fallot tetralogy Q21.3    5. Other fatigue R53.83 CBC with platelets     TSH with free T4 reflex     Vitamin B12     Ferritin     Iron and iron binding capacity   6. Hypertension goal BP (blood pressure) < 140/90 I10 Comprehensive metabolic panel     Lipid panel reflex to direct LDL Fasting   7. Radial styloid tenosynovitis of left hand M65.4 [facility]  "  8. Screening for malignant neoplasm of cervix Z12.4 Pap imaged thin layer screen with HPV - recommended age 30 - 65 years (select HPV order below)     HPV High Risk Types DNA Cervical   9. Radial styloid tenosynovitis of right hand M65.4 [facility]   10. Screening for HIV (human immunodeficiency virus) Z11.4    11. Heart valve replaced Z95.2    Will refer to hand therapy for the tenosynovitis. Encouraged patient to discuss with work proper ergonomics.   Labs today for fatigue. If no abnormalities will need to see cardiology sooner for possible stress test.   Refilled contraception.       COUNSELING:  Reviewed preventive health counseling, as reflected in patient instructions       Regular exercise       Healthy diet/nutrition       Contraception       Family planning       HIV screeninx in teen years, 1x in adult years, and at intervals if high risk    BP Readings from Last 1 Encounters:   18 128/85     Estimated body mass index is 27.75 kg/m  as calculated from the following:    Height as of this encounter: 1.602 m (5' 3.07\").    Weight as of this encounter: 71.2 kg (157 lb).      Weight management plan: Discussed healthy diet and exercise guidelines     reports that  has never smoked. she has never used smokeless tobacco.      Counseling Resources:  ATP IV Guidelines  Pooled Cohorts Equation Calculator  Breast Cancer Risk Calculator  FRAX Risk Assessment  ICSI Preventive Guidelines  Dietary Guidelines for Americans, 2010  USDA's MyPlate  ASA Prophylaxis  Lung CA Screening    Mel Dias PA-C  [facility]  "

## 2018-12-31 NOTE — RESULT ENCOUNTER NOTE
Lynne,     Your labs are all normal. I would like you to make an appointment with your cardiologist within the next month to discuss your fatigue. I am worried that the tiredness might be related to your heart. Please let me know if you have any questions. Your pap smear results will be coming in a separate note.   Mel Dias PA-C\

## 2019-01-07 PROBLEM — R87.810 CERVICAL HIGH RISK HPV (HUMAN PAPILLOMAVIRUS) TEST POSITIVE: Status: ACTIVE | Noted: 2018-12-28

## 2019-01-29 ENCOUNTER — THERAPY VISIT (OUTPATIENT)
Dept: OCCUPATIONAL THERAPY | Facility: CLINIC | Age: 34
End: 2019-01-29
Payer: COMMERCIAL

## 2019-01-29 DIAGNOSIS — M25.532 PAIN IN BOTH WRISTS: Primary | ICD-10-CM

## 2019-01-29 DIAGNOSIS — M25.531 PAIN IN BOTH WRISTS: Primary | ICD-10-CM

## 2019-01-29 DIAGNOSIS — M65.4 RADIAL STYLOID TENOSYNOVITIS OF LEFT HAND: ICD-10-CM

## 2019-01-29 DIAGNOSIS — M65.4 RADIAL STYLOID TENOSYNOVITIS OF RIGHT HAND: ICD-10-CM

## 2019-01-29 PROCEDURE — 97140 MANUAL THERAPY 1/> REGIONS: CPT | Mod: GO | Performed by: OCCUPATIONAL THERAPIST

## 2019-01-29 PROCEDURE — 97165 OT EVAL LOW COMPLEX 30 MIN: CPT | Mod: GO | Performed by: OCCUPATIONAL THERAPIST

## 2019-01-29 PROCEDURE — 97760 ORTHOTIC MGMT&TRAING 1ST ENC: CPT | Mod: GO | Performed by: OCCUPATIONAL THERAPIST

## 2019-01-29 PROCEDURE — 97110 THERAPEUTIC EXERCISES: CPT | Mod: GO | Performed by: OCCUPATIONAL THERAPIST

## 2019-01-29 NOTE — PROGRESS NOTES
Hand Therapy Initial Evaluation    Current Date:  1/29/2019    Subjective:  Lynne Smith is a 33 year old right hand dominant female.    Diagnosis:   Bilateral wrist pain/deQuervains (right > left)  DOI:  12/28/18 (therapy referral)    Patient reports symptoms of pain, stiffness/loss of motion, weakness/loss of strength and edema of bilateral wrists and thumbs which occurred due to sudden onset in October 2018 with unknown etiology. Since onset symptoms are gradually getting worse.  Special tests:  none.  Previous treatment: massage, acupuncture , self stretching, heat, ice, splinting.  General health as reported by patient is good.  Pertinent medical history includes:Heart Problems, High Blood Pressure  Medical allergies:adhesives and see list in EMR.  Surgical history: heart x 5.  Medication history: High Blood Pressure.    Occupational Profile Information:  Current occupation is [employer]   Currently working in normal job without restrictions  Job Tasks: Computer Work, Prolonged Sitting, Repetitive Tasks  Prior functional level:  independent-shared household chores  Barriers include:none  Mobility: No difficulty  Transportation: drives    Functional Outcome Measure:  Upper Extremity Functional Index  SCORE:   Column Totals: 42/80  (A lower score indicates greater disability.)    Objective:  Functional Exam:  - no pain, + mild, ++ moderate, +++ severe  ROM:  Wrist 1/29/19   AROM(PROM) Right   Extension 70-   Flexion 60+   RD 20-   UD 35+   UD with th flex 15++     Wrist 1/29/19   AROM(PROM) Left   Extension 70-   Flexion 60+   RD 20-   UD 20+   UD with th flex 5+++     Strength:   (Measured in pounds)   1/29/19    Trials Right Left   1  2  3 55-  43-  46- 26+  27+  30+   Average: 48 28     Lat Pinch 1/29/19    Trials Right Left   1  2  3 15-  14-  14- 14-  14-  14-   Average: 14 14     3 Pt Pinch 1/29/19    Trials Right Left   1  2  3 17-  17-  15- 12-  11-  13-   Average: 16 12     MMT:    1/29/19   Thumb EPL R:++  L:+   Thumb EPB R:+  L: ++   Thumb APL R:-  L:-     Palpation:   1/29/19   Radial Styloid R:++  L:+++   1st dorsal comp. R:+  L:++   FCR R:-  L:+   CMC joint R:+ slight  L:+ slight     Special Tests:     1/29/19   Finkelstein  R:++  L:+++   Radial Nerve Tinels R:-  L:-   CMC grind R:-  L:-   CMC passive retrop R:-  L:-     Edema:  Mild inflammatory radial wrist/1st dorsal compartment bilaterally     Pain Report:  VAS(0-10) 1/29/19   At Rest: 7/10   With Use: 10+/10    Location:  Radial wrists  Pain Quality:  Dull and Tender  Frequency: constant    Pain is worst:  nighttime  Exacerbated by:  Twisting, gripping  Relieved by:  cold and rest  Progression:  Gradually worsening  Sensation:  WNL throughout all nerve distributions; per patient report    Assessment:  Patient presents with symptoms consistent with diagnosis of bialteral DeQuervains tendonitis, with conservative intervention.     Patient's limitations or Problem List includes:  Pain, Decreased ROM/motion, Increased edema, Weakness, Decreased  and Tightness in musculature of bilateral wrists and thumbs which interferes with the patient's ability to perform Self Care Tasks (dressing, eating, bathing), Work Tasks, Sleep Patterns, Household Chores and Driving  as compared to previous level of function.    Rehab Potential:  Excellent - Return to full activity, no limitations    Patient will benefit from skilled Occupational Therapy to increase ROM, flexibility, overall strength and  strength and decrease pain and edema to return to previous activity level and resume normal daily tasks and to reach their rehab potential.    Barriers to Learning:  No barrier    Communication Issues:  Patient appears to be able to clearly communicate and understand verbal and written communication and follow directions correctly.    Chart Review: Chart Review and Simple history review with patient    Identified Performance Deficits: bathing/showering,  "dressing, hygiene and grooming, driving and community mobility, home establishment and management, meal preparation and cleanup, sleep and work    Assessment of Occupational Performance:  5 or more Performance Deficits     Clinical Decision Making (Complexity): Low complexity    Treatment Explanation:  The following has been discussed with the patient:    RX ordered/plan of care  Anticipated outcomes  Possible risks and side effects    Plan:  Frequency:  1 X week, once daily  Duration:  for 6 weeks    Treatment Plan:    Modalities:    US   Therapeutic Exercise:   AROM of wrist and thumb  PROM with stretch to wrist and thumb extensors   Manual Techniques:   Friction massage over 1st dorsal compartment  Myofascial release of the thumb extensors and flexors  Neuromuscular:   Kinesiotaping  Orthotic Fabrication:    Forearm based thumb spica orthoses  Self Care:   Ergonomic consideration   Diagnostic education  Discharge Plan:    Achieve all LTG.  Independent in home treatment program.  Reach maximal therapeutic benefit.    Home Program:   Warmth for stiffness  Ice after activity for pain  Self TFM to 1st dorsal compartment  Self MFR to EPB/ABL  AROM of wrist and thumb  Right [place] thumb spica orthosis sleeping and per symptoms day  Avoid activities that exacerbate pain in the wrist or thumb, \"park\" thumb and keep wrist neutral with ADL's out of orthosis     Next Visit:  See in 1 week  Assess response to HEP and right otobock orthosis, fit with left otobock orthosis  Trial kinesiotape as indicated   Progress to PROM with stretch into simultaneous thumb flexion and ulnar deviation as tolerated  Consider US if continued tenderness at radial wrists  "

## 2019-02-07 ENCOUNTER — THERAPY VISIT (OUTPATIENT)
Dept: OCCUPATIONAL THERAPY | Facility: CLINIC | Age: 34
End: 2019-02-07
Payer: COMMERCIAL

## 2019-02-07 DIAGNOSIS — M65.4 RADIAL STYLOID TENOSYNOVITIS OF LEFT HAND: ICD-10-CM

## 2019-02-07 DIAGNOSIS — M25.532 PAIN IN BOTH WRISTS: Primary | ICD-10-CM

## 2019-02-07 DIAGNOSIS — M65.4 RADIAL STYLOID TENOSYNOVITIS OF RIGHT HAND: ICD-10-CM

## 2019-02-07 DIAGNOSIS — M25.531 PAIN IN BOTH WRISTS: Primary | ICD-10-CM

## 2019-02-07 PROCEDURE — 97763 ORTHC/PROSTC MGMT SBSQ ENC: CPT | Mod: GO | Performed by: OCCUPATIONAL THERAPIST

## 2019-02-07 PROCEDURE — 97035 APP MDLTY 1+ULTRASOUND EA 15: CPT | Mod: GO | Performed by: OCCUPATIONAL THERAPIST

## 2019-02-07 PROCEDURE — 97110 THERAPEUTIC EXERCISES: CPT | Mod: GO | Performed by: OCCUPATIONAL THERAPIST

## 2019-02-07 NOTE — PROGRESS NOTES
"SOAP note objective information for 2/7/2019:    Palpation:   1/29/19 2/7/19   Radial Styloid R:++  L:+++ R:-  L:++   1st dorsal comp. R:+  L:++ R:+ slight  L:++   FCR R:-  L:+ R:-  L:+ slight   CMC joint R:+ slight  L:+ slight R:-  L:-     Pain Report:  VAS(0-10) 1/29/19 2/7/19   At Rest: 7/10 5/10   With Use: 10+/10  7-8/10   Location:  Radial wrists    Home Program:   Warmth for stiffness  Ice after activity for pain  Self TFM to 1st dorsal compartment  Self MFR to EPB/ABL  AROM of wrist and thumb  Gentle PROM with stretch into simultaneous thumb flexion and ulnar deviation right, left as tolerated  Bilateral [place] thumb spica orthosis sleeping and per symptoms day  Avoid activities that exacerbate pain in the wrist or thumb, \"park\" thumb and keep wrist neutral with ADL's out of orthosis     Next Visit:  See in 1 week  Cont US, TFM, MFR and ROM  Assess response to deQ stretch and left otobock orthosis  Trial kinesiotape as indicated     Please refer to the daily flowsheet for treatment today, total treatment time and time spent performing 1:1 timed codes.   "

## 2019-04-17 PROBLEM — M25.532 PAIN IN BOTH WRISTS: Status: RESOLVED | Noted: 2019-01-29 | Resolved: 2019-04-17

## 2019-04-17 PROBLEM — M65.4 RADIAL STYLOID TENOSYNOVITIS OF RIGHT HAND: Status: RESOLVED | Noted: 2019-01-29 | Resolved: 2019-04-17

## 2019-04-17 PROBLEM — M25.531 PAIN IN BOTH WRISTS: Status: RESOLVED | Noted: 2019-01-29 | Resolved: 2019-04-17

## 2019-04-17 PROBLEM — M65.4 RADIAL STYLOID TENOSYNOVITIS OF LEFT HAND: Status: RESOLVED | Noted: 2019-01-29 | Resolved: 2019-04-17

## 2019-04-17 NOTE — PROGRESS NOTES
Discharge Summary - Hand Therapy  Pt cancelled appointmen 2/14/19 and has not returned for therapy.  Assume all goals met to pt satisfaction.  D/C Hand Therapy.

## 2019-05-14 ENCOUNTER — OFFICE VISIT (OUTPATIENT)
Dept: FAMILY MEDICINE | Facility: CLINIC | Age: 34
End: 2019-05-14
Payer: COMMERCIAL

## 2019-05-14 VITALS
WEIGHT: 163 LBS | TEMPERATURE: 98.7 F | HEIGHT: 63 IN | OXYGEN SATURATION: 100 % | DIASTOLIC BLOOD PRESSURE: 76 MMHG | HEART RATE: 72 BPM | SYSTOLIC BLOOD PRESSURE: 121 MMHG | BODY MASS INDEX: 28.88 KG/M2

## 2019-05-14 DIAGNOSIS — N89.8 VAGINAL DISCHARGE: ICD-10-CM

## 2019-05-14 DIAGNOSIS — R10.9 FLANK PAIN: Primary | ICD-10-CM

## 2019-05-14 DIAGNOSIS — Z11.1 SCREENING EXAMINATION FOR PULMONARY TUBERCULOSIS: ICD-10-CM

## 2019-05-14 LAB
ALBUMIN UR-MCNC: NEGATIVE MG/DL
APPEARANCE UR: CLEAR
BACTERIA #/AREA URNS HPF: ABNORMAL /HPF
BILIRUB UR QL STRIP: NEGATIVE
COLOR UR AUTO: YELLOW
GLUCOSE UR STRIP-MCNC: NEGATIVE MG/DL
HGB UR QL STRIP: ABNORMAL
KETONES UR STRIP-MCNC: NEGATIVE MG/DL
LEUKOCYTE ESTERASE UR QL STRIP: NEGATIVE
NITRATE UR QL: NEGATIVE
NON-SQ EPI CELLS #/AREA URNS LPF: ABNORMAL /LPF
PH UR STRIP: 6 PH (ref 5–7)
RBC #/AREA URNS AUTO: ABNORMAL /HPF
SOURCE: ABNORMAL
SP GR UR STRIP: <=1.005 (ref 1–1.03)
SPECIMEN SOURCE: NORMAL
UROBILINOGEN UR STRIP-ACNC: 0.2 EU/DL (ref 0.2–1)
WBC #/AREA URNS AUTO: ABNORMAL /HPF
WET PREP SPEC: NORMAL

## 2019-05-14 PROCEDURE — 87210 SMEAR WET MOUNT SALINE/INK: CPT | Performed by: PHYSICIAN ASSISTANT

## 2019-05-14 PROCEDURE — 81001 URINALYSIS AUTO W/SCOPE: CPT | Performed by: PHYSICIAN ASSISTANT

## 2019-05-14 PROCEDURE — 99213 OFFICE O/P EST LOW 20 MIN: CPT | Performed by: PHYSICIAN ASSISTANT

## 2019-05-14 PROCEDURE — 86580 TB INTRADERMAL TEST: CPT | Performed by: PHYSICIAN ASSISTANT

## 2019-05-14 RX ORDER — METRONIDAZOLE 500 MG/1
500 TABLET ORAL 2 TIMES DAILY
Qty: 14 TABLET | Refills: 0 | Status: SHIPPED | OUTPATIENT
Start: 2019-05-14 | End: 2019-07-28

## 2019-05-14 ASSESSMENT — MIFFLIN-ST. JEOR: SCORE: 1413.36

## 2019-05-14 NOTE — NURSING NOTE
Mantoux given to patient today    Time of administration 1:11 PM     Date of administration 5/14/19     Given in right forearm.     Patient advised to return 48- 72 hours for reading.     Sign  Rebeca See IVETH Ingram

## 2019-05-14 NOTE — NURSING NOTE
The patient is asked the following questions today and these are her answers:    -Have you had a mantoux administered in the past 30 days?    No  -Have you had a previous positive Mantoux.  No  -Have you received BCG in the past.  No  -Have you had a live vaccine  (MMR, Varicella, OPV, Yellow Fever) in the last 6 weeks.  No  -Have you had and active  viral or bacterial infection in the past 6 weeks.  No  -Have you received corticosteroids or immunosuppressive agents in the past 6 weeks.  No  -Have you been diagnosed with HIV?  No  -Do you have a maglinancy?  No     Rebeca See IVETH Ingram

## 2019-05-14 NOTE — PROGRESS NOTES
SUBJECTIVE:   Lynne Sultana is a 34 year old female who presents to clinic today for the following   health issues:    URINARY TRACT SYMPTOMS  Onset: couple of weeks now- seems darker in the morning.     Description:   Painful urination (Dysuria): no   Blood in urine (Hematuria): no   Delay in urine (Hesitency): YES- a little  Urinary frequency- every 30-40 minutes.   Back Pain: Yes    Intensity: moderate    Progression of Symptoms:  same    Accompanying Signs & Symptoms:  Fever/chills: feeling hot  Flank pain YES- mild on both sides  Nausea and vomiting: no   Any vaginal symptoms: vaginal itching a little  Abdominal/Pelvic Pain: no     History:   History of frequent UTI's: YES  History of kidney stones: no   Sexually Active: YES  Possibility of pregnancy: No    Precipitating factors:   None    Therapies Tried and outcome: Increase fluid intake which helps a little    Mantoux test as pt is switching career.    Uses the summer ti wipes at times. There is an odor, but unsure if it is different.     Additional history: as documented    Reviewed  and updated as needed this visit by clinical staff  Tobacco  Allergies  Meds  Problems  Med Hx  Surg Hx  Fam Hx  Soc Hx          Reviewed and updated as needed this visit by Provider  Tobacco  Allergies  Meds  Problems  Med Hx  Surg Hx  Fam Hx         Patient Active Problem List   Diagnosis     Fallot tetralogy     Acne     CARDIOVASCULAR SCREENING; LDL GOAL LESS THAN 160     Hypertension goal BP (blood pressure) < 140/90     Right ventricular hypertrophy     Right ventricular enlargement     Pulmonary insufficiency     Encounter for other contraceptive management     PVC's (premature ventricular contractions)     S/P pulmonary valve replacement     Heart valve replaced     Cervical high risk HPV (human papillomavirus) test positive     Past Surgical History:   Procedure Laterality Date     CARDIAC SURGERY  11/2017    defibulator placement     CARDIAC SURGERY  " 2017    pig valve-pulmonary valve     CARDIAC SURGERY  2017    cardiac mesh revision-[facility]     SURGICAL HISTORY OF -   1986,age 13 ,age 15    for fallot tetralogyx4     SURGICAL HISTORY OF -       eyelid       Social History     Tobacco Use     Smoking status: Never Smoker     Smokeless tobacco: Never Used   Substance Use Topics     Alcohol use: Yes     Comment: sometimes     Family History   Problem Relation Age of Onset     Hypertension Mother      Arthritis Mother      Eye Disorder Mother      Hypertension Father      Diabetes Maternal Grandmother      Hypertension Maternal Grandmother      Eye Disorder Maternal Grandmother      Diabetes Maternal Grandfather      Cancer Paternal Grandmother      Asthma Sister      Eye Disorder Sister      Eye Disorder Brother            ROS:  Constitutional, HEENT, cardiovascular, pulmonary, gi and gu systems are negative, except as otherwise noted.    OBJECTIVE:     /76 (BP Location: Right arm, Patient Position: Chair, Cuff Size: Adult Regular)   Pulse 72   Temp 98.7  F (37.1  C) (Oral)   Ht 1.6 m (5' 2.99\")   Wt 73.9 kg (163 lb)   SpO2 100%   BMI 28.88 kg/m    Body mass index is 28.88 kg/m .  GENERAL: healthy, alert and no distress  ABDOMEN: soft, nontender, no hepatosplenomegaly, no masses and bowel sounds normal  BACK: no CVA tenderness, no paralumbar tenderness    Diagnostic Test Results:  Results for orders placed or performed in visit on 05/14/19 (from the past 24 hour(s))   UA reflex to Microscopic and Culture   Result Value Ref Range    Color Urine Yellow     Appearance Urine Clear     Glucose Urine Negative NEG^Negative mg/dL    Bilirubin Urine Negative NEG^Negative    Ketones Urine Negative NEG^Negative mg/dL    Specific Gravity Urine <=1.005 1.003 - 1.035    Blood Urine Trace (A) NEG^Negative    pH Urine 6.0 5.0 - 7.0 pH    Protein Albumin Urine Negative NEG^Negative mg/dL    Urobilinogen Urine 0.2 0.2 - 1.0 EU/dL    Nitrite Urine Negative NEG^Negative    " Leukocyte Esterase Urine Negative NEG^Negative    Source Midstream Urine    Urine Microscopic   Result Value Ref Range    WBC Urine 0 - 5 OTO5^0 - 5 /HPF    RBC Urine O - 2 OTO2^O - 2 /HPF    Squamous Epithelial /LPF Urine Few FEW^Few /LPF    Bacteria Urine Few (A) NEG^Negative /HPF   Wet prep   Result Value Ref Range    Specimen Description Vagina     Wet Prep No Trichomonas seen     Wet Prep No clue cells seen     Wet Prep No yeast seen     Wet Prep Rare  WBC'S seen          ASSESSMENT/PLAN:       ICD-10-CM    1. Flank pain R10.9 UA reflex to Microscopic and Culture     Urine Microscopic     Wet prep   2. Screening examination for pulmonary tuberculosis Z11.1 VACCINE ADMINISTRATION, INITIAL   3. Vaginal discharge N89.8 metroNIDAZOLE (FLAGYL) 500 MG tablet   Screening ppd for school done today. Follow up for read.   With WBC seen on the wet prep will treat with flagyl. If not improving or new symptoms return to clinic. No signs of UTI.     FUTURE APPOINTMENTS:       - Follow-up for annual visit or as needed    Mel Dias PA-C  [facility]

## 2019-05-16 ENCOUNTER — ALLIED HEALTH/NURSE VISIT (OUTPATIENT)
Dept: NURSING | Facility: CLINIC | Age: 34
End: 2019-05-16
Payer: COMMERCIAL

## 2019-05-16 DIAGNOSIS — Z11.1 SCREENING FOR TUBERCULOSIS: Primary | ICD-10-CM

## 2019-05-16 LAB
PPDINDURATION: 0 MM (ref 0–5)
PPDREDNESS: 0 MM

## 2019-05-16 PROCEDURE — 99207 ZZC NO CHARGE NURSE ONLY: CPT

## 2019-05-16 NOTE — NURSING NOTE
Mantoux result:  Lab Results   Component Value Date    PPDREDNESS 0 05/16/2019    PPDINDURATIO 0 05/16/2019     Is induration greater than 5mm?  No     Mantoux results:  Negative  No induration.  No swelling.  No redness.    Patient hand-carried form on which I documented negative result.    Adriana Santana RN  [facility]

## 2019-06-17 PROBLEM — Z79.01 LONG TERM CURRENT USE OF ANTICOAGULANT THERAPY: Status: RESOLVED | Noted: 2017-08-08 | Resolved: 2018-12-28

## 2019-07-28 ENCOUNTER — OFFICE VISIT (OUTPATIENT)
Dept: URGENT CARE | Facility: URGENT CARE | Age: 34
End: 2019-07-28
Payer: COMMERCIAL

## 2019-07-28 VITALS
HEART RATE: 62 BPM | RESPIRATION RATE: 20 BRPM | TEMPERATURE: 98.4 F | WEIGHT: 166.8 LBS | OXYGEN SATURATION: 100 % | BODY MASS INDEX: 29.55 KG/M2 | SYSTOLIC BLOOD PRESSURE: 132 MMHG | DIASTOLIC BLOOD PRESSURE: 78 MMHG

## 2019-07-28 DIAGNOSIS — R35.0 URINARY FREQUENCY: Primary | ICD-10-CM

## 2019-07-28 DIAGNOSIS — N30.00 ACUTE CYSTITIS WITHOUT HEMATURIA: ICD-10-CM

## 2019-07-28 LAB
ALBUMIN UR-MCNC: NEGATIVE MG/DL
APPEARANCE UR: CLEAR
BACTERIA #/AREA URNS HPF: ABNORMAL /HPF
BILIRUB UR QL STRIP: NEGATIVE
COLOR UR AUTO: YELLOW
GLUCOSE UR STRIP-MCNC: NEGATIVE MG/DL
HGB UR QL STRIP: ABNORMAL
KETONES UR STRIP-MCNC: NEGATIVE MG/DL
LEUKOCYTE ESTERASE UR QL STRIP: NEGATIVE
NITRATE UR QL: NEGATIVE
NON-SQ EPI CELLS #/AREA URNS LPF: ABNORMAL /LPF
PH UR STRIP: 7 PH (ref 5–7)
RBC #/AREA URNS AUTO: ABNORMAL /HPF
SOURCE: ABNORMAL
SP GR UR STRIP: 1.02 (ref 1–1.03)
UROBILINOGEN UR STRIP-ACNC: 1 EU/DL (ref 0.2–1)
WBC #/AREA URNS AUTO: ABNORMAL /HPF

## 2019-07-28 PROCEDURE — 99213 OFFICE O/P EST LOW 20 MIN: CPT | Performed by: PHYSICIAN ASSISTANT

## 2019-07-28 PROCEDURE — 81001 URINALYSIS AUTO W/SCOPE: CPT | Performed by: PHYSICIAN ASSISTANT

## 2019-07-28 RX ORDER — SULFAMETHOXAZOLE/TRIMETHOPRIM 800-160 MG
1 TABLET ORAL 2 TIMES DAILY
Qty: 10 TABLET | Refills: 0 | Status: SHIPPED | OUTPATIENT
Start: 2019-07-28 | End: 2019-08-15

## 2019-07-28 NOTE — PROGRESS NOTES
SUBJECTIVE:   Lynne Sultana is a 34 year old female presenting with a chief complaint of   Chief Complaint   Patient presents with     UTI     bladder pain, flank pain, back pain, urinary frequency, odor, dark urine color        She is an established patient of [facility].    UTI    Onset of symptoms was 2week(s).  Course of illness is worsening  Severity moderate  Current and associated symptoms dysuria, frequency and suprapubic pain and pressure  Treatment and measures tried Increase fluid intake  Predisposing factors include history of Pyelonephritis  Patient denies temperature > 101 degrees F., vaginal discharge, vaginal odor and vaginal itching            Review of Systems    Past Medical History:   Diagnosis Date     Acne      Cervical high risk HPV (human papillomavirus) test positive 12/28/2018     Essential hypertension, benign      Fallot tetralogy      History of blood transfusion     as a child with surg. Last time at age 15.     Pulmonary insufficiency     sees cards Dr. Jatinder Fields yearly     Right ventricular enlargement      Right ventricular hypertrophy      Family History   Problem Relation Age of Onset     Hypertension Mother      Arthritis Mother      Eye Disorder Mother      Hypertension Father      Diabetes Maternal Grandmother      Hypertension Maternal Grandmother      Eye Disorder Maternal Grandmother      Diabetes Maternal Grandfather      Cancer Paternal Grandmother      Asthma Sister      Eye Disorder Sister      Eye Disorder Brother      Current Outpatient Medications   Medication Sig Dispense Refill     acetaminophen (TYLENOL) 500 MG tablet Take 1-2 tablets (500-1,000 mg) by mouth every 6 hours as needed for mild pain 30 tablet 0     aspirin 81 MG chewable tablet Take 81 mg by mouth daily       Loratadine (CLARITIN PO) Take by mouth as needed       metoprolol tartrate (LOPRESSOR) 25 MG tablet Take 1 tablet (25 mg) by mouth 2 times daily 180 tablet 3     norethindrone (MICRONOR) 0.35 MG  tablet Take 1 tablet (0.35 mg) by mouth daily 84 tablet 3     Omeprazole (PRILOSEC PO) Take by mouth as needed       sulfamethoxazole-trimethoprim (BACTRIM DS/SEPTRA DS) 800-160 MG tablet Take 1 tablet by mouth 2 times daily for 5 days 10 tablet 0     Social History     Tobacco Use     Smoking status: Never Smoker     Smokeless tobacco: Never Used   Substance Use Topics     Alcohol use: Yes     Comment: sometimes       OBJECTIVE  /78   Pulse 62   Temp 98.4  F (36.9  C) (Tympanic)   Resp 20   Wt 75.7 kg (166 lb 12.8 oz)   LMP 06/02/2019 (Within Days)   SpO2 100%   BMI 29.55 kg/m      Physical Exam   Constitutional: She appears well-developed and well-nourished.   Abdominal: She exhibits no mass. There is no tenderness. There is no rebound and no guarding. No hernia.   Musculoskeletal: She exhibits no tenderness.       Labs:  Results for orders placed or performed in visit on 07/28/19 (from the past 24 hour(s))   *UA reflex to Microscopic and Culture ([facility] (except [place] and [place])   Result Value Ref Range    Color Urine Yellow     Appearance Urine Clear     Glucose Urine Negative NEG^Negative mg/dL    Bilirubin Urine Negative NEG^Negative    Ketones Urine Negative NEG^Negative mg/dL    Specific Gravity Urine 1.025 1.003 - 1.035    Blood Urine Trace (A) NEG^Negative    pH Urine 7.0 5.0 - 7.0 pH    Protein Albumin Urine Negative NEG^Negative mg/dL    Urobilinogen Urine 1.0 0.2 - 1.0 EU/dL    Nitrite Urine Negative NEG^Negative    Leukocyte Esterase Urine Negative NEG^Negative    Source Midstream Urine    Urine Microscopic   Result Value Ref Range    WBC Urine 0 - 5 OTO5^0 - 5 /HPF    RBC Urine O - 2 OTO2^O - 2 /HPF    Squamous Epithelial /LPF Urine Moderate (A) FEW^Few /LPF    Bacteria Urine Many (A) NEG^Negative /HPF       X-Ray was not done.    ASSESSMENT:      ICD-10-CM    1. Urinary frequency R35.0 *UA reflex to Microscopic and Culture ([facility] (except  [place] and [place])     Urine Microscopic   2. Acute cystitis without hematuria N30.00 sulfamethoxazole-trimethoprim (BACTRIM DS/SEPTRA DS) 800-160 MG tablet        Medical Decision Making:    Differential Diagnosis:  UTI: UTI, Urethritis, Vaginitis, STD and Interstitial Cystitis    Serious Comorbid Conditions:  Adult:  None    PLAN:    UTI Adult:  SEPTRA (SULFA) ALLERGY:  Ciprofloxacin 250mg twice daily for 3 days    Followup:    If not improving or if condition worsens, follow up with your Primary Care Provider    There are no Patient Instructions on file for this visit.

## 2019-08-15 ENCOUNTER — HOSPITAL ENCOUNTER (EMERGENCY)
Facility: CLINIC | Age: 34
Discharge: HOME | End: 2019-08-15
Attending: PHYSICIAN ASSISTANT | Admitting: PHYSICIAN ASSISTANT
Payer: COMMERCIAL

## 2019-08-15 VITALS
BODY MASS INDEX: 28.35 KG/M2 | DIASTOLIC BLOOD PRESSURE: 90 MMHG | TEMPERATURE: 98 F | RESPIRATION RATE: 16 BRPM | OXYGEN SATURATION: 98 % | WEIGHT: 160 LBS | SYSTOLIC BLOOD PRESSURE: 146 MMHG

## 2019-08-15 DIAGNOSIS — H18.821 CORNEAL ABRASION OF RIGHT EYE DUE TO CONTACT LENS: ICD-10-CM

## 2019-08-15 PROCEDURE — 99213 OFFICE O/P EST LOW 20 MIN: CPT | Mod: Z6 | Performed by: PHYSICIAN ASSISTANT

## 2019-08-15 PROCEDURE — G0463 HOSPITAL OUTPT CLINIC VISIT: HCPCS

## 2019-08-15 RX ORDER — TETRACAINE HYDROCHLORIDE 5 MG/ML
1-2 SOLUTION OPHTHALMIC ONCE
Status: DISCONTINUED | OUTPATIENT
Start: 2019-08-15 | End: 2019-08-15 | Stop reason: HOSPADM

## 2019-08-15 RX ORDER — CIPROFLOXACIN HYDROCHLORIDE 3.5 MG/ML
SOLUTION/ DROPS TOPICAL
Qty: 10 ML | Refills: 0 | Status: SHIPPED | OUTPATIENT
Start: 2019-08-15 | End: 2020-02-07

## 2019-08-15 NOTE — LETTER
August 15, 2019      To Whom It May Concern:      Lynne Sultana was seen in our Emergency Department today, 08/15/19.  Please excuse patient from work tomorrow if her eye is bothering her from her corneal abrasion.  Thank you.      Sincerely,        Autumn Rolon PA-C

## 2019-08-15 NOTE — ED AVS SNAPSHOT
[facility] Emergency Department  [address] [address]  Phone:  [phone]  Fax:  [phone]                                    Lynne Sultana   MRN: [id number]    Department:  [facility] Emergency Department   Date of Visit:  8/15/2019           After Visit Summary Signature Page    I have received my discharge instructions, and my questions have been answered. I have discussed any challenges I see with this plan with the nurse or doctor.    ..........................................................................................................................................  Patient/Patient Representative Signature      ..........................................................................................................................................  Patient Representative Print Name and Relationship to Patient    ..................................................               ................................................  Date                                   Time    ..........................................................................................................................................  Reviewed by Signature/Title    ...................................................              ..............................................  Date                                               Time          22EPIC Rev 08/18

## 2019-08-15 NOTE — ED PROVIDER NOTES
History     Chief Complaint   Patient presents with     Conjunctivitis     possible pink eye to right eye burning and itching watery since yesterday no FB no blurred vision     HPI  Lynne Sultana is a 34 year old female who presents with complaints of right eye redness, itching, and watery drainage since yesterday.  Pt states she has had eye irritation and foreign body sensation since putting in her contact yesterday.  She has also had associated photophobia.  Pt has had blurry vision from the drainage but denies double vision.  Denies trauma to the eye.  Denies associated infectious or allergic symptoms; denies fevers, chills, headaches, sneezing, rhinorrhea, nasal congestion, cough, or sore throat.          Allergies:  Allergies   Allergen Reactions     Adhesive Tape      Paper Tape OK. Coban OK     Mold      Morphine Nausea and Vomiting     As a child: N&V,Hallucinations, Panic.       Problem List:    Patient Active Problem List    Diagnosis Date Noted     Cervical high risk HPV (human papillomavirus) test positive 12/28/2018     Priority: Medium     5/29/09 NIL pap  8/12/10 NIL pap  8/29/11 NIL pap  11/20/15 NIL pap. Plan: pap in 3 years.  12/28/18 NIL pap, + HR HPV (not 16 or 18). 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     Priority: Medium     S/P pulmonary valve replacement 07/25/2017     Priority: Medium     6/2017- pulmonary valve replaced [facility] valve [facility]       PVC's (premature ventricular contractions) 01/09/2017     Priority: Medium     Encounter for other contraceptive management 11/20/2015     Priority: Medium     Hypertension goal BP (blood pressure) < 140/90 08/29/2011     Priority: Medium     Right ventricular hypertrophy      Priority: Medium     Right ventricular enlargement      Priority: Medium     Pulmonary insufficiency      Priority: Medium     CARDIOVASCULAR SCREENING; LDL GOAL LESS THAN 160 10/31/2010     Priority: Medium     Fallot tetralogy 11/04/2009      Priority: Medium     Acne 11/04/2009     Priority: Medium        Past Medical History:    Past Medical History:   Diagnosis Date     Acne      Cervical high risk HPV (human papillomavirus) test positive 12/28/2018     Essential hypertension, benign      Fallot tetralogy      History of blood transfusion      Pulmonary insufficiency      Right ventricular enlargement      Right ventricular hypertrophy        Past Surgical History:    Past Surgical History:   Procedure Laterality Date     CARDIAC SURGERY  11/2017    defibulator placement     CARDIAC SURGERY  2017    pig valve-pulmonary valve     CARDIAC SURGERY  2017    cardiac mesh revision-[facility]     SURGICAL HISTORY OF -   1986,age 13 ,age 15    for fallot tetralogyx4     SURGICAL HISTORY OF -       eyelid       Family History:    Family History   Problem Relation Age of Onset     Hypertension Mother      Arthritis Mother      Eye Disorder Mother      Hypertension Father      Diabetes Maternal Grandmother      Hypertension Maternal Grandmother      Eye Disorder Maternal Grandmother      Diabetes Maternal Grandfather      Cancer Paternal Grandmother      Asthma Sister      Eye Disorder Sister      Eye Disorder Brother        Social History:  Marital Status:   [2]  Social History     Tobacco Use     Smoking status: Never Smoker     Smokeless tobacco: Never Used   Substance Use Topics     Alcohol use: Yes     Comment: sometimes     Drug use: No        Medications:      ciprofloxacin (CILOXAN) 0.3 % ophthalmic solution   acetaminophen (TYLENOL) 500 MG tablet   aspirin 81 MG chewable tablet   Loratadine (CLARITIN PO)   metoprolol tartrate (LOPRESSOR) 25 MG tablet   norethindrone (MICRONOR) 0.35 MG tablet   Omeprazole (PRILOSEC PO)         Review of Systems   Constitutional: Negative.  Negative for fever.   HENT: Negative.    Eyes: Positive for photophobia, pain, discharge (watery), redness and itching. Negative for visual disturbance.   Skin: Negative.    All  other systems reviewed and are negative.      Physical Exam   BP: (!) 146/90  Heart Rate: 79  Temp: 98  F (36.7  C)  Resp: 16  Weight: 72.6 kg (160 lb)  SpO2: 98 %      Physical Exam   Constitutional: She appears well-developed and well-nourished.  Non-toxic appearance. No distress.   HENT:   Head: Normocephalic and atraumatic.   Nose: Nose normal.   Eyes: Pupils are equal, round, and reactive to light. EOM and lids are normal. Right eye exhibits discharge (watery). Right eye exhibits no chemosis, no exudate and no hordeolum. No foreign body present in the right eye. Left eye exhibits no chemosis, no discharge, no exudate and no hordeolum. No foreign body present in the left eye. Right conjunctiva is injected. Right conjunctiva has no hemorrhage. Left conjunctiva is not injected. Left conjunctiva has no hemorrhage.   Slit lamp exam:       The right eye shows corneal abrasion and fluorescein uptake. The right eye shows no corneal flare, no corneal ulcer, no foreign body, no hyphema and no anterior chamber bulge.       Linear horizontal corneal abrasion in right eye at the 9 o'clock position   Neck: Neck supple.   Pulmonary/Chest: Effort normal.   Musculoskeletal: Normal range of motion.   Neurological: She is alert.   Skin: Skin is warm and dry. No rash noted.       ED Course        Procedures    No results found for this or any previous visit (from the past 24 hour(s)).    Medications - No data to display    Assessments & Plan (with Medical Decision Making)     Pt is a 34 year old female who presents with complaints of right eye redness, itching, and watery drainage since yesterday.  Pt states she has had eye irritation and foreign body sensation since putting in her contact yesterday.  She has also had associated photophobia.  Pt has had blurry vision from the drainage but denies double vision.  Denies trauma to the eye.  Pt is afebrile on arrival.  Exam as above.  Corneal abrasion on exam.  Return precautions were  reviewed.  Hand-outs were provided.    Patient was sent with Ciloxan ophthalmic drops and was instructed to follow-up with [facility] if no improvement in 2-3 days for continued care and management or sooner if new or worsening symptoms.  She is to return to the ED for persistent and/or worsening symptoms.  Patient expressed understanding of the diagnosis and plan and was discharged home in good condition.    I have reviewed the nursing notes.    I have reviewed the findings, diagnosis, plan and need for follow up with the patient.    Discharge Medication List as of 8/15/2019  4:10 PM      START taking these medications    Details   ciprofloxacin (CILOXAN) 0.3 % ophthalmic solution Instill 1-2 drops into right eye every 2 hours while awake for 2 days and 1-2 drops every 4 hours while awake for the next 5 days, Disp-10 mL, R-0, E-Prescribe             Final diagnoses:   Corneal abrasion of right eye due to contact lens       8/15/2019   [facility] EMERGENCY DEPARTMENT      Disclaimer:  This note consists of symbols derived from keyboarding, dictation and/or voice recognition software.  As a result, there may be errors in the script that have gone undetected.  Please consider this when interpreting information found in this chart.     Autumn Rolon PA-C  08/16/19 1521

## 2019-08-16 ASSESSMENT — ENCOUNTER SYMPTOMS
PHOTOPHOBIA: 1
EYE PAIN: 1
EYE REDNESS: 1
FEVER: 0
CONSTITUTIONAL NEGATIVE: 1
EYE ITCHING: 1
EYE DISCHARGE: 1

## 2019-09-03 ENCOUNTER — TRANSFERRED RECORDS (OUTPATIENT)
Dept: HEALTH INFORMATION MANAGEMENT | Facility: CLINIC | Age: 34
End: 2019-09-03

## 2019-09-09 LAB — EJECTION FRACTION: 56

## 2019-11-04 DIAGNOSIS — Z30.8 ENCOUNTER FOR OTHER CONTRACEPTIVE MANAGEMENT: ICD-10-CM

## 2019-11-04 RX ORDER — ACETAMINOPHEN AND CODEINE PHOSPHATE 120; 12 MG/5ML; MG/5ML
SOLUTION ORAL
Qty: 84 TABLET | Refills: 0 | Status: SHIPPED | OUTPATIENT
Start: 2019-11-04 | End: 2020-01-30

## 2019-11-04 NOTE — TELEPHONE ENCOUNTER
"Requested Prescriptions   Pending Prescriptions Disp Refills     norethindrone (MICRONOR) 0.35 MG tablet [Pharmacy Med Name: NORETHINDRONE 0.35MG TABS] 84 tablet 3     Sig: TAKE ONE TABLET BY MOUTH EVERY DAY   Last Written Prescription Date:  12/28/18  Last Fill Quantity: 84,  # refills: 3   Last office visit: 5/14/2019 with prescribing provider:     Future Office Visit:        Contraceptives Protocol Passed - 11/4/2019  9:04 AM        Passed - Patient is not a current smoker if age is 35 or older        Passed - Recent (12 mo) or future (30 days) visit within the authorizing provider's specialty     Patient has had an office visit with the authorizing provider or a provider within the authorizing providers department within the previous 12 mos or has a future within next 30 days. See \"Patient Info\" tab in inbasket, or \"Choose Columns\" in Meds & Orders section of the refill encounter.              Passed - Medication is active on med list        Passed - No active pregnancy on record        Passed - No positive pregnancy test in past 12 months          "

## 2019-11-04 NOTE — TELEPHONE ENCOUNTER
Prescription approved per [facility] [place] [facility].    Rhea Mace, RN, BSN, PHN  [facility]: [place]

## 2020-01-28 DIAGNOSIS — Z30.8 ENCOUNTER FOR OTHER CONTRACEPTIVE MANAGEMENT: ICD-10-CM

## 2020-01-28 NOTE — TELEPHONE ENCOUNTER
"Requested Prescriptions   Pending Prescriptions Disp Refills     norethindrone (MICRONOR) 0.35 MG tablet [Pharmacy Med Name: NORETHINDRONE 0.35MG TABS] 84 tablet 0     Sig: TAKE ONE TABLET BY MOUTH ONCE DAILY   Last Written Prescription Date:  11/4/19  Last Fill Quantity: 84,  # refills: 0   Last office visit: 5/14/2019 with prescribing provider:     Future Office Visit:   Next 5 appointments (look out 90 days)    Feb 07, 2020  7:40 AM CST  PHYSICAL with Mel Dias PA-C  [facility] [address] [place] [id number]  [phone]             Contraceptives Protocol Passed - 1/28/2020  9:07 AM        Passed - Patient is not a current smoker if age is 35 or older        Passed - Recent (12 mo) or future (30 days) visit within the authorizing provider's specialty     Patient has had an office visit with the authorizing provider or a provider within the authorizing providers department within the previous 12 mos or has a future within next 30 days. See \"Patient Info\" tab in inbasket, or \"Choose Columns\" in Meds & Orders section of the refill encounter.              Passed - Medication is active on med list        Passed - No active pregnancy on record        Passed - No positive pregnancy test in past 12 months          "

## 2020-01-30 RX ORDER — ACETAMINOPHEN AND CODEINE PHOSPHATE 120; 12 MG/5ML; MG/5ML
SOLUTION ORAL
Qty: 84 TABLET | Refills: 0 | Status: SHIPPED | OUTPATIENT
Start: 2020-01-30 | End: 2020-02-07

## 2020-01-30 NOTE — TELEPHONE ENCOUNTER
Prescription approved per [facility] [place].    Rhea Mace, RN, BSN, PHN  [facility]: [place]

## 2020-01-31 DIAGNOSIS — Z30.8 ENCOUNTER FOR OTHER CONTRACEPTIVE MANAGEMENT: ICD-10-CM

## 2020-01-31 RX ORDER — ACETAMINOPHEN AND CODEINE PHOSPHATE 120; 12 MG/5ML; MG/5ML
SOLUTION ORAL
Qty: 84 TABLET | Refills: 0 | OUTPATIENT
Start: 2020-01-31

## 2020-01-31 NOTE — TELEPHONE ENCOUNTER
"Requested Prescriptions   Pending Prescriptions Disp Refills     norethindrone (MICRONOR) 0.35 MG tablet [Pharmacy Med Name: NORETHINDRONE 0.35MG TABS] 84 tablet 0     Sig: TAKE ONE TABLET BY MOUTH ONCE DAILY   Last Written Prescription Date:  1-  Last Fill Quantity: 84,  # refills: 0   Last office visit: 5/14/2019 with prescribing provider:     Future Office Visit:   Next 5 appointments (look out 90 days)    Feb 07, 2020  7:40 AM CST  PHYSICAL with Mel Dias PA-C  [facility] [address] [place] [id number]  [phone]             Contraceptives Protocol Passed - 1/31/2020  8:36 AM        Passed - Patient is not a current smoker if age is 35 or older        Passed - Recent (12 mo) or future (30 days) visit within the authorizing provider's specialty     Patient has had an office visit with the authorizing provider or a provider within the authorizing providers department within the previous 12 mos or has a future within next 30 days. See \"Patient Info\" tab in inbasket, or \"Choose Columns\" in Meds & Orders section of the refill encounter.              Passed - Medication is active on med list        Passed - No active pregnancy on record        Passed - No positive pregnancy test in past 12 months          "

## 2020-02-07 ENCOUNTER — OFFICE VISIT (OUTPATIENT)
Dept: FAMILY MEDICINE | Facility: CLINIC | Age: 35
End: 2020-02-07
Payer: COMMERCIAL

## 2020-02-07 VITALS
WEIGHT: 159 LBS | HEIGHT: 63 IN | DIASTOLIC BLOOD PRESSURE: 84 MMHG | HEART RATE: 80 BPM | BODY MASS INDEX: 28.17 KG/M2 | SYSTOLIC BLOOD PRESSURE: 120 MMHG | TEMPERATURE: 98 F

## 2020-02-07 DIAGNOSIS — Z00.00 ROUTINE GENERAL MEDICAL EXAMINATION AT A HEALTH CARE FACILITY: Primary | ICD-10-CM

## 2020-02-07 DIAGNOSIS — Z30.8 ENCOUNTER FOR OTHER CONTRACEPTIVE MANAGEMENT: ICD-10-CM

## 2020-02-07 DIAGNOSIS — Z12.4 SCREENING FOR MALIGNANT NEOPLASM OF CERVIX: ICD-10-CM

## 2020-02-07 PROCEDURE — 87624 HPV HI-RISK TYP POOLED RSLT: CPT | Performed by: PHYSICIAN ASSISTANT

## 2020-02-07 PROCEDURE — G0145 SCR C/V CYTO,THINLAYER,RESCR: HCPCS | Performed by: PHYSICIAN ASSISTANT

## 2020-02-07 PROCEDURE — 99395 PREV VISIT EST AGE 18-39: CPT | Performed by: PHYSICIAN ASSISTANT

## 2020-02-07 RX ORDER — ACETAMINOPHEN AND CODEINE PHOSPHATE 120; 12 MG/5ML; MG/5ML
0.35 SOLUTION ORAL DAILY
Qty: 84 TABLET | Refills: 3 | Status: SHIPPED | OUTPATIENT
Start: 2020-02-07 | End: 2021-03-19

## 2020-02-07 ASSESSMENT — MIFFLIN-ST. JEOR: SCORE: 1395.22

## 2020-02-07 ASSESSMENT — ENCOUNTER SYMPTOMS
FEVER: 0
DIARRHEA: 0
DIZZINESS: 0
FREQUENCY: 0
CONSTIPATION: 0
EYE PAIN: 0
HEMATURIA: 0
CHILLS: 0
COUGH: 0
ABDOMINAL PAIN: 0
HEMATOCHEZIA: 0
NERVOUS/ANXIOUS: 0

## 2020-02-07 NOTE — PATIENT INSTRUCTIONS
Vitamin D3, 8275-4555 international unit(s) daily.         Preventive Health Recommendations  Female Ages 26 - 39  Yearly exam:   See your health care provider every year in order to    Review health changes.     Discuss preventive care.      Review your medicines if you your doctor has prescribed any.    Until age 30: Get a Pap test every three years (more often if you have had an abnormal result).    After age 30: Talk to your doctor about whether you should have a Pap test every 3 years or have a Pap test with HPV screening every 5 years.   You do not need a Pap test if your uterus was removed (hysterectomy) and you have not had cancer.  You should be tested each year for STDs (sexually transmitted diseases), if you're at risk.   Talk to your provider about how often to have your cholesterol checked.  If you are at risk for diabetes, you should have a diabetes test (fasting glucose).  Shots: Get a flu shot each year. Get a tetanus shot every 10 years.   Nutrition:     Eat at least 5 servings of fruits and vegetables each day.    Eat whole-grain bread, whole-wheat pasta and brown rice instead of white grains and rice.    Get adequate Calcium and Vitamin D.     Lifestyle    Exercise at least 150 minutes a week (30 minutes a day, 5 days of the week). This will help you control your weight and prevent disease.    Limit alcohol to one drink per day.    No smoking.     Wear sunscreen to prevent skin cancer.    See your dentist every six months for an exam and cleaning.

## 2020-02-07 NOTE — PROGRESS NOTES
SUBJECTIVE:   CC: Lynne Sultana is an 34 year old woman who presents for preventive health visit.     Healthy Habits:     Getting at least 3 servings of Calcium per day:  Yes    Bi-annual eye exam:  Yes    Dental care twice a year:  Yes    Sleep apnea or symptoms of sleep apnea:  Daytime drowsiness    Diet:  Low salt    Frequency of exercise:  1 day/week    Duration of exercise:  Less than 15 minutes    Taking medications regularly:  Yes    Barriers to taking medications:  None    Medication side effects:  Not applicable    PHQ-2 Total Score: 0    Additional concerns today:  No    Takes the omeprazole as needed.       Inside nose it has been sore. On and off for x4 weeks. On the inside of the right nostril.  Has had a few bad bloody noses. Pain with touching. Has tried neosporin and vaseline. Not overly helpful.     Today's PHQ-2 Score:   PHQ-2 ( 1999 Pfizer) 2/7/2020   Q1: Little interest or pleasure in doing things 0   Q2: Feeling down, depressed or hopeless 0   PHQ-2 Score 0   Q1: Little interest or pleasure in doing things Not at all   Q2: Feeling down, depressed or hopeless Not at all   PHQ-2 Score 0       Abuse: Current or Past(Physical, Sexual or Emotional)- No  Do you feel safe in your environment? Yes        Social History     Tobacco Use     Smoking status: Never Smoker     Smokeless tobacco: Never Used   Substance Use Topics     Alcohol use: Yes     Comment: sometimes     If you drink alcohol do you typically have >3 drinks per day or >7 drinks per week? No    Alcohol Use 2/7/2020   Prescreen: >3 drinks/day or >7 drinks/week? No   Prescreen: >3 drinks/day or >7 drinks/week? -       Reviewed orders with patient.  Reviewed health maintenance and updated orders accordingly - No  Labs reviewed in EPIC    Mammogram not appropriate for this patient based on age.    Pertinent mammograms are reviewed under the imaging tab.  History of abnormal Pap smear: YES - updated in Problem List and Health Maintenance  "accordingly  PAP / HPV Latest Ref Rng & Units 12/28/2018 11/20/2015 8/29/2011   PAP - NIL NIL NIL   HPV 16 DNA NEG:Negative Negative - -   HPV 18 DNA NEG:Negative Negative - -   OTHER HR HPV NEG:Negative Positive(A) - -     Reviewed and updated as needed this visit by clinical staff  Tobacco  Allergies  Meds  Problems  Med Hx  Surg Hx  Fam Hx         Reviewed and updated as needed this visit by Provider  Tobacco  Allergies  Meds  Problems  Med Hx  Surg Hx  Fam Hx            Review of Systems   Constitutional: Negative for chills and fever.   HENT: Negative for congestion and ear pain.    Eyes: Negative for pain.   Respiratory: Negative for cough.    Cardiovascular: Negative for chest pain.   Gastrointestinal: Negative for abdominal pain, constipation, diarrhea and hematochezia.   Genitourinary: Negative for frequency and hematuria.   Neurological: Negative for dizziness.   Psychiatric/Behavioral: The patient is not nervous/anxious.    Decreased sex drive recently. No changes, tried stopping metoprolol with cardios blessing and not helping.      OBJECTIVE:   /84   Pulse 80   Temp 98  F (36.7  C) (Oral)   Ht 1.6 m (5' 2.99\")   Wt 72.1 kg (159 lb)   LMP 01/07/2020   BMI 28.17 kg/m    Physical Exam  GENERAL: healthy, alert and no distress  EYES: Eyes grossly normal to inspection, PERRL and conjunctivae and sclerae normal  HENT: ear canals and TM's normal, nose and mouth without ulcers or lesions  NECK: no adenopathy, no asymmetry, masses, or scars and thyroid normal to palpation  RESP: lungs clear to auscultation - no rales, rhonchi or wheezes  BREAST: normal without masses, tenderness or nipple discharge and no palpable axillary masses or adenopathy  CV: regular rate and rhythm, normal S1 S2, no S3 or S4, no murmur, click or rub, no peripheral edema and peripheral pulses strong  ABDOMEN: soft, nontender, no hepatosplenomegaly, no masses and bowel sounds normal   (female): normal female " "external genitalia, normal urethral meatus, vaginal mucosa pink, moist, well rugated, and normal cervix/adnexa/uterus without masses with moderate white discharge  MS: no gross musculoskeletal defects noted, no edema  SKIN: no suspicious lesions or rashes  NEURO: Normal strength and tone, mentation intact and speech normal  PSYCH: mentation appears normal, affect normal/bright    Diagnostic Test Results:  none     ASSESSMENT/PLAN:   1. Routine general medical examination at a health care facility    2. Screening for malignant neoplasm of cervix  - Pap imaged thin layer screen with HPV - recommended age 30 - 65 years (select HPV order below)  - HPV High Risk Types DNA Cervical    3. Encounter for other contraceptive management  - norethindrone (MICRONOR) 0.35 MG tablet; Take 1 tablet (0.35 mg) by mouth daily  Dispense: 84 tablet; Refill: 3    COUNSELING:  Reviewed preventive health counseling, as reflected in patient instructions       Regular exercise       Healthy diet/nutrition       Contraception       Safe sex practices/STD prevention    Estimated body mass index is 28.17 kg/m  as calculated from the following:    Height as of this encounter: 1.6 m (5' 2.99\").    Weight as of this encounter: 72.1 kg (159 lb).    Weight management plan: Discussed healthy diet and exercise guidelines     reports that she has never smoked. She has never used smokeless tobacco.      Counseling Resources:  ATP IV Guidelines  Pooled Cohorts Equation Calculator  Breast Cancer Risk Calculator  FRAX Risk Assessment  ICSI Preventive Guidelines  Dietary Guidelines for Americans, 2010  USDA's MyPlate  ASA Prophylaxis  Lung CA Screening    Mel Dias PA-C  [facility]  "

## 2020-02-07 NOTE — LETTER
February 13, 2020    Lynne Sultana  [address]  [place] [address]    Dear ,  This letter is regarding your recent Pap smear (cervical cancer screening) and Human Papillomavirus (HPV) test.  We are happy to inform you that your Pap smear result is normal. Cervical cancer is closely linked with certain types of HPV. Your results showed no evidence of high-risk HPV.  We recommend you have your next PAP smear and HPV test in 3 years.  You will still need to return to the clinic every year for an annual exam and other preventive tests.  If you have additional questions regarding this result, please call our registered nurse, Lulú at [phone].  Sincerely,    Mel Dias PA-C/guillaume

## 2020-02-11 LAB
COPATH REPORT: NORMAL
PAP: NORMAL

## 2020-02-13 LAB
FINAL DIAGNOSIS: NORMAL
HPV HR 12 DNA CVX QL NAA+PROBE: NEGATIVE
HPV16 DNA SPEC QL NAA+PROBE: NEGATIVE
HPV18 DNA SPEC QL NAA+PROBE: NEGATIVE
SPECIMEN DESCRIPTION: NORMAL
SPECIMEN SOURCE CVX/VAG CYTO: NORMAL

## 2020-04-28 ENCOUNTER — NURSE TRIAGE (OUTPATIENT)
Dept: FAMILY MEDICINE | Facility: CLINIC | Age: 35
End: 2020-04-28

## 2020-04-28 ENCOUNTER — HOSPITAL ENCOUNTER (EMERGENCY)
Facility: CLINIC | Age: 35
Discharge: HOME | End: 2020-04-28
Attending: EMERGENCY MEDICINE | Admitting: EMERGENCY MEDICINE
Payer: COMMERCIAL

## 2020-04-28 VITALS
RESPIRATION RATE: 14 BRPM | SYSTOLIC BLOOD PRESSURE: 163 MMHG | WEIGHT: 163 LBS | TEMPERATURE: 98 F | BODY MASS INDEX: 28.88 KG/M2 | DIASTOLIC BLOOD PRESSURE: 90 MMHG | OXYGEN SATURATION: 100 %

## 2020-04-28 DIAGNOSIS — S05.91XA RIGHT EYE INJURY, INITIAL ENCOUNTER: ICD-10-CM

## 2020-04-28 PROCEDURE — 99283 EMERGENCY DEPT VISIT LOW MDM: CPT | Performed by: EMERGENCY MEDICINE

## 2020-04-28 PROCEDURE — 25000125 ZZHC RX 250: Performed by: EMERGENCY MEDICINE

## 2020-04-28 PROCEDURE — 99284 EMERGENCY DEPT VISIT MOD MDM: CPT | Mod: Z6 | Performed by: EMERGENCY MEDICINE

## 2020-04-28 RX ORDER — TETRACAINE HYDROCHLORIDE 5 MG/ML
1-2 SOLUTION OPHTHALMIC ONCE
Status: COMPLETED | OUTPATIENT
Start: 2020-04-28 | End: 2020-04-28

## 2020-04-28 RX ORDER — ERYTHROMYCIN 5 MG/G
OINTMENT OPHTHALMIC ONCE
Status: COMPLETED | OUTPATIENT
Start: 2020-04-28 | End: 2020-04-28

## 2020-04-28 RX ADMIN — ERYTHROMYCIN: 5 OINTMENT OPHTHALMIC at 14:07

## 2020-04-28 RX ADMIN — TETRACAINE HYDROCHLORIDE 2 DROP: 5 SOLUTION OPHTHALMIC at 13:22

## 2020-04-28 ASSESSMENT — ENCOUNTER SYMPTOMS
GASTROINTESTINAL NEGATIVE: 1
MUSCULOSKELETAL NEGATIVE: 1
PSYCHIATRIC NEGATIVE: 1
EYE DISCHARGE: 0
CARDIOVASCULAR NEGATIVE: 1
HEMATOLOGIC/LYMPHATIC NEGATIVE: 1
ENDOCRINE NEGATIVE: 1
PHOTOPHOBIA: 1
RESPIRATORY NEGATIVE: 1
EYE ITCHING: 0
NEUROLOGICAL NEGATIVE: 1
ALLERGIC/IMMUNOLOGIC NEGATIVE: 1
CONSTITUTIONAL NEGATIVE: 1
EYE PAIN: 1

## 2020-04-28 NOTE — ED PROVIDER NOTES
"  History     Chief Complaint   Patient presents with     Eye Problem     right eye pain since 0400, pt states \" I think I might have scratched my eye while I was sleeping \" right eye is red and swollen some blurred vision     HPI  Lynne Sultana is a 35 year old female who presents for evaluation for acute right eye pain and discomfort.  Patient states \"I think I might scratch my eye while sleeping\".  She arrived in the department reporting blurry vision with with swelling about her right eyelid and pain in her right eye.  She does wear contacts but did not forget to remove her contact from sleep last night she last wore her contact on April 26, 2020.  Also wears glasses.  She woke up with excruciating pain and discomfort and swelling about the eyelid, and tearing, with light sensitivity she presents for further care.  She reports no eye pressure.   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 epic bioprosthesis inducible RVOT VT status post cryoablation and single-chamber ICD implantation for primary prevention. Patient is  currently prescribed omeprazole loratadine baby aspirin and on oral contraception.    Allergies:  Allergies   Allergen Reactions     Adhesive Tape      Paper Tape OK. Coban OK     Mold      Morphine Nausea and Vomiting     As a child: N&V,Hallucinations, Panic.       Problem List:    Patient Active Problem List    Diagnosis Date Noted     Cervical high risk HPV (human papillomavirus) test positive 12/28/2018     Priority: Medium     5/29/09 NIL pap  8/12/10 NIL pap  8/29/11 NIL pap  11/20/15 NIL pap. Plan: pap in 3 years.  12/28/18 NIL pap, + HR HPV (not 16 or 18). Plan: cotest in 1 yr  2/7/20 NIL Pap, Neg HPV. Plan: Cotest in 3 yr.        Heart valve replaced 08/15/2017     Priority: Medium     S/P pulmonary valve replacement 07/25/2017 "     Priority: Medium     6/2017- pulmonary valve replaced with pig valve at [facility]       PVC's (premature ventricular contractions) 01/09/2017     Priority: Medium     Encounter for other contraceptive management 11/20/2015     Priority: Medium     Hypertension goal BP (blood pressure) < 140/90 08/29/2011     Priority: Medium     Right ventricular hypertrophy      Priority: Medium     Right ventricular enlargement      Priority: Medium     Pulmonary insufficiency      Priority: Medium     CARDIOVASCULAR SCREENING; LDL GOAL LESS THAN 160 10/31/2010     Priority: Medium     Fallot tetralogy 11/04/2009     Priority: Medium     Acne 11/04/2009     Priority: Medium        Past Medical History:    Past Medical History:   Diagnosis Date     Acne      Cervical high risk HPV (human papillomavirus) test positive 12/28/2018     Essential hypertension, benign      Fallot tetralogy      History of blood transfusion      Pulmonary insufficiency      Right ventricular enlargement      Right ventricular hypertrophy        Past Surgical History:    Past Surgical History:   Procedure Laterality Date     CARDIAC SURGERY  11/2017    defibulator placement     CARDIAC SURGERY  2017    pig valve-pulmonary valve     CARDIAC SURGERY  2017    cardiac mesh revision-[place]     SURGICAL HISTORY OF -   1986,age 13 ,age 15    for fallot tetralogyx4     SURGICAL HISTORY OF -       eyelid       Family History:    Family History   Problem Relation Age of Onset     Hypertension Mother      Arthritis Mother      Eye Disorder Mother      Hypertension Father      Diabetes Maternal Grandmother      Hypertension Maternal Grandmother      Eye Disorder Maternal Grandmother      Diabetes Maternal Grandfather      Cancer Paternal Grandmother      Asthma Sister      Eye Disorder Sister      Eye Disorder Brother        Social History:  Marital Status:   [2]  Social History     Tobacco Use     Smoking status: Never Smoker     Smokeless tobacco: Never  Used   Substance Use Topics     Alcohol use: Yes     Comment: sometimes     Drug use: No        Medications:    acetaminophen (TYLENOL) 500 MG tablet  aspirin 81 MG chewable tablet  Loratadine (CLARITIN PO)  norethindrone (MICRONOR) 0.35 MG tablet  Omeprazole (PRILOSEC PO)          Review of Systems   Constitutional: Negative.    HENT: Negative.    Eyes: Positive for photophobia, pain and visual disturbance. Negative for discharge and itching.   Respiratory: Negative.    Cardiovascular: Negative.    Gastrointestinal: Negative.    Endocrine: Negative.    Genitourinary: Negative.    Musculoskeletal: Negative.    Skin: Negative.    Allergic/Immunologic: Negative.    Neurological: Negative.    Hematological: Negative.    Psychiatric/Behavioral: Negative.    All other systems reviewed and are negative.      Physical Exam   BP: (!) 163/90  Heart Rate: 89  Temp: 98  F (36.7  C)  Resp: 14  Weight: 73.9 kg (163 lb)  SpO2: 100 %      Physical Exam  HENT:      Head: Normocephalic and atraumatic.      Nose: Nose normal. No congestion or rhinorrhea.   Eyes:      General:         Right eye: No foreign body or discharge.         Left eye: No foreign body or discharge.      Extraocular Movements: Extraocular movements intact.      Right eye: Normal extraocular motion and no nystagmus.      Left eye: Normal extraocular motion and no nystagmus.      Conjunctiva/sclera:      Right eye: Right conjunctiva is injected. No chemosis, exudate or hemorrhage.     Pupils: Pupils are equal, round, and reactive to light.     Neck:      Musculoskeletal: Normal range of motion and neck supple.   Cardiovascular:      Rate and Rhythm: Normal rate and regular rhythm.   Skin:     Capillary Refill: Capillary refill takes less than 2 seconds.   Neurological:      General: No focal deficit present.      Mental Status: She is alert and oriented to person, place, and time.   Psychiatric:         Mood and Affect: Mood normal.         Behavior: Behavior  normal.         Thought Content: Thought content normal.         Judgment: Judgment normal.         ED Course        Procedures               Critical Care time:  none               No results found for this or any previous visit (from the past 24 hour(s)).    ED medications  Medications   tetracaine (PONTOCAINE) 0.5 % ophthalmic solution 1-2 drop (2 drops Left Eye Given 4/28/20 1322)   erythromycin (ROMYCIN) ophthalmic ointment ( Right Eye Given 4/28/20 1407)     ED Vitals:  Vitals:    04/28/20 1313   BP: (!) 163/90   Resp: 14   Temp: 98  F (36.7  C)   TempSrc: Oral   SpO2: 100%   Weight: 73.9 kg (163 lb)     ED labs and imaging: none      Assessments & Plan (with Medical Decision Making)   Clinical impression: 35-year-old female who presented with acute right eye pain and discomfort likely due to trauma to the eye without obvious corneal abrasion but the upper eyelid swelling and discomfort and conjunctival injection without chemosis or hyphema or concern for acute angle-closure glaucoma.  Exam was not concerning for traumatic iritis.  She is discharged home with trial of supportive care with erythromycin ointment for lubrication, soft eye patch and follow-up in eye clinic within 48 hours if symptoms persist or progress.  Her exam was notable for swelling in the inside corner of the upper eyelid with   localized tenderness but no active bleeding or significant laceration.  There was no globe injury.  She has an extensive medical history including pulmonary atresia with VSD status post surgical repair with pulmonary artery stenosis and PVR Status post ICD implantation for primary prevention.      ED course and Plan:  Reviewed the  medical record.  Reviewed cardiology follow-up on September 2019.  Patient had minimal relief with tetracaine ophthalmic solution.  Slit-lamp exam revealed no discrete corneal abrasion.  There was no flare in the anterior chamber.  No Laura sign.  Globe is soft to palpation and pupils  were equal reactive to light and with accommodation.   Visual acuity in the department revealed 20/100 in the right, and 20/80 in the left without corrective lenses   She is discharged home with erythromycin ointment for comfort, soft eye patch and advised to follow-up into the eye care clinic within 48 hours if her symptoms persist or worsen or return to the department for additional care if symptoms progress.  She is given a work note and counseled not to wear contacts for at least a few days until symptoms resolved.      Disclaimer: This note consists of symbols derived from keyboarding, dictation and/or voice recognition software. As a result, there may be errors in the script that have gone undetected. Please consider this when interpreting information found in this chart.  I have reviewed the nursing notes.    I have reviewed the findings, diagnosis, plan and need for follow up with the patient.       New Prescriptions    No medications on file       Final diagnoses:   Right eye injury, initial encounter - accidentally scratched eye during sleep - on April 27 4/28/2020   [facility] EMERGENCY DEPARTMENT     Conor Mcdonnell MD  04/28/20 [id number]

## 2020-04-28 NOTE — ED NOTES
Woke up yesterday and her rt eye was irritating, she was able to work all day but the eye was irritating her all day and through the not the eye has become worse, increased pain, tearing, and swelling. She states her rt eye doesn't always close during sleep and can cause some irritation however this is more then normal. Does wear contacts but has not had them in now for two days and is using her prescription eyewear. Was outdoors all day on Sunday working in the yard and unsure if something got in her eye or just from being out in the wind, however she doesn't recall anything in her eye. Last wore contact on Sat am or eye make-up. She is a/o x 4

## 2020-04-28 NOTE — DISCHARGE INSTRUCTIONS
1) your evaluation today suggests injury in the inside corner of your upper eyelid of your right eye. This may have occurred in your sleep. You appear stable for discharge with plan for  follow-up in the eye clinic if symptoms persist or worsen he should return to the department to be reevaluated to call the eye clinic to schedule follow-up visit within the next 48 hours    2)We have reviewed eye care for home. Use the patch for light sensitivity.  Apply the antibiotic ointment four times a day for the next 3 to 5 days- 1/2 inch ribbon in lower eyelid.

## 2020-04-28 NOTE — LETTER
April 28, 2020      To Whom It May Concern:      Lynne Sultana was seen in our Emergency Department today, 04/28/20.  I expect her condition to improve over the next 3 days.  She may return to work when improved.  Patient is unable to work due to her injury and symptoms and her visit in the department.  She may need to return if her symptoms worsen.  This work note is valid until May 1, 2020.        Sincerely,        Mendel Mcdonnell MD

## 2020-04-28 NOTE — ED NOTES
Discharge info reviewed with pt states understanding sent with note for work and take home medication [facility] eye oint. Pt ambulatory out of ED. Has own eye patch.

## 2020-04-28 NOTE — TELEPHONE ENCOUNTER
"See triage for severe eye pain and swelling.    GO TO ED NOW: You need to be seen in the Emergency Department. Go to the ER at closest Hospital. Leave now. Drive carefully.  She says she cannot drive due to pain and blurry vision,  home and will drive her.    Adriana Santana RN  [facility]      Reason for Disposition    Severe eye pain    Additional Information    Negative: Followed an eye injury    Negative: Eye pain from chemical in the eye    Negative: Eye pain from foreign body in eye    Negative: Has sinus pain or pressure    Answer Assessment - Initial Assessment Questions  1. ONSET: \"When did the pain start?\" (e.g., minutes, hours, days)      yesterday  2. TIMING: \"Does the pain come and go, or has it been constant since it started?\" (e.g., constant, intermittent, fleeting)      constant  3. SEVERITY: \"How bad is the pain?\"   (Scale 1-10; mild, moderate or severe)    - MILD (1-3): doesn't interfere with normal activities     - MODERATE (4-7): interferes with normal activities or awakens from sleep     - SEVERE (8-10): excruciating pain and patient unable to do normal activities      \"12\"  4. LOCATION: \"Where does it hurt?\"  (e.g., eyelid, eye, cheekbone)      Around the eye (lids are swollen and painful), most pain above eye  5. CAUSE: \"What do you think is causing the pain?\"      Unknown, no known foreign body or injury  6. VISION: \"Do you have blurred vision or changes in your vision?\"       Vision is blurry, she did try to put her contact in today but was too painful to keep in, fuzzy vision even with contact  7. EYE DISCHARGE: \"Is there any discharge (pus) from the eye(s)?\"  If yes, ask: \"What color is it?\"       Clear watery, no pus or crusting  8. FEVER: \"Do you have a fever?\" If so, ask: \"What is it, how was it measured, and when did it start?\"       no  9. OTHER SYMPTOMS: \"Do you have any other symptoms?\" (e.g., headache, nasal discharge, facial rash)      " "Nauseated due to pain, denies headache, thin watery nasal discharge, denies rash  10. PREGNANCY: \"Is there any chance you are pregnant?\" \"When was your last menstrual period?\"        no    Protocols used: EYE PAIN-A-OH      "

## 2020-04-28 NOTE — ED AVS SNAPSHOT
[facility]  [address] [address]  Phone:  [phone]  Fax:  [phone]                                    Lynne Sultana   MRN: [id number]    Department:  [facility]   Date of Visit:  4/28/2020           After Visit Summary Signature Page    I have received my discharge instructions, and my questions have been answered. I have discussed any challenges I see with this plan with the nurse or doctor.    ..........................................................................................................................................  Patient/Patient Representative Signature      ..........................................................................................................................................  Patient Representative Print Name and Relationship to Patient    ..................................................               ................................................  Date                                   Time    ..........................................................................................................................................  Reviewed by Signature/Title    ...................................................              ..............................................  Date                                               Time          22EPIC Rev 08/18

## 2020-05-06 ENCOUNTER — TELEPHONE (OUTPATIENT)
Dept: OPTOMETRY | Facility: CLINIC | Age: 35
End: 2020-05-06

## 2020-05-06 ENCOUNTER — OFFICE VISIT (OUTPATIENT)
Dept: OPTOMETRY | Facility: CLINIC | Age: 35
End: 2020-05-06
Payer: COMMERCIAL

## 2020-05-06 DIAGNOSIS — H16.001 ULCER OF RIGHT CORNEA: Primary | ICD-10-CM

## 2020-05-06 PROCEDURE — 92002 INTRM OPH EXAM NEW PATIENT: CPT | Performed by: OPTOMETRIST

## 2020-05-06 RX ORDER — BESIFLOXACIN 6 MG/ML
1 SUSPENSION OPHTHALMIC
Qty: 1 BOTTLE | Refills: 1 | Status: SHIPPED | OUTPATIENT
Start: 2020-05-06 | End: 2020-05-07 | Stop reason: ALTCHOICE

## 2020-05-06 ASSESSMENT — EXTERNAL EXAM - RIGHT EYE: OD_EXAM: NORMAL

## 2020-05-06 ASSESSMENT — EXTERNAL EXAM - LEFT EYE: OS_EXAM: NORMAL

## 2020-05-06 ASSESSMENT — VISUAL ACUITY
OS_CC: 20/20
OD_CC+: -1
CORRECTION_TYPE: GLASSES
METHOD: SNELLEN - LINEAR
OD_CC: 20/25

## 2020-05-06 ASSESSMENT — SLIT LAMP EXAM - LIDS
COMMENTS: NORMAL
COMMENTS: MILD SWELLING

## 2020-05-06 NOTE — LETTER
5/6/2020         RE: Lynne Sultana  [address]  [place] [address]        Dear Colleague,    Thank you for referring your patient, Lynne Sultana, to the [facility]. Please see a copy of my visit note below.    Chief Complaint   Patient presents with     Eye Problem Right Eye       Do you wear contact lenses? Yes. Hasn't worn her contacts at all since her eye got hurt          In right eye. Onset was sudden. This started 10 days ago. Occurring constantly. Since onset it is gradually worsening. Associated symptoms include redness, tearing, pain with eye movement, photophobia, swelling, and foreign body sensation. Treatments tried include eye drops, artificial tears, and ointment. Response to treatment was mild improvement. Pain was noted as 10/10.   Comments      Patient said that about 10 days ago she was doing yard work and something happened. She's not sure if something hit it or flew into it or what. She is a contact lens wearer, she hasn't put her contacts in since before the incident.  She did go ER and they told her that there was a cut on upper lid and microscopic cut on her eye as well. She was given an ointment, which she is still using as directed, 3 times per day. She is not sure what the ointment is called.  Patient states her eye is still very painful and also very sensitive to the light.        Babita Barrios Optometric Assistant      See Review Of Systems       Medical, surgical and family histories reviewed and updated 5/6/2020.         OBJECTIVE: See Ophthalmology exam    ASSESSMENT:    ICD-10-CM    1. Ulcer of right cornea  H16.001 Besifloxacin HCl (BESIVANCE) 0.6 % SUSP      PLAN:    Patient Instructions   Besivance or substitute- 1 drop right eye every hour, erythromycin ointment at night.    A dilating eye drop was used today.  Your vision may be blurry for 6-8 hours at near.    Return in 1 day for recheck to see Dr. Mayen at the [place] office.    Dario Vo,  OD               Again, thank you for allowing me to participate in the care of your patient.        Sincerely,        Dario Vo OD

## 2020-05-06 NOTE — PROGRESS NOTES
Chief Complaint   Patient presents with     Eye Problem Right Eye       Do you wear contact lenses? Yes. Hasn't worn her contacts at all since her eye got hurt          In right eye. Onset was sudden. This started 10 days ago. Occurring constantly. Since onset it is gradually worsening. Associated symptoms include redness, tearing, pain with eye movement, photophobia, swelling, and foreign body sensation. Treatments tried include eye drops, artificial tears, and ointment. Response to treatment was mild improvement. Pain was noted as 10/10.   Comments      Patient said that about 10 days ago she was doing yard work and something happened. She's not sure if something hit it or flew into it or what. She is a contact lens wearer, she hasn't put her contacts in since before the incident.  She did go ER and they told her that there was a cut on upper lid and microscopic cut on her eye as well. She was given an ointment, which she is still using as directed, 3 times per day. She is not sure what the ointment is called.  Patient states her eye is still very painful and also very sensitive to the light.        Babita Barrios Optometric Assistant      See Review Of Systems       Medical, surgical and family histories reviewed and updated 5/6/2020.         OBJECTIVE: See Ophthalmology exam    ASSESSMENT:    ICD-10-CM    1. Ulcer of right cornea  H16.001 Besifloxacin HCl (BESIVANCE) 0.6 % SUSP      PLAN:    Patient Instructions   Besivance or substitute- 1 drop right eye every hour, erythromycin ointment at night.    A dilating eye drop was used today.  Your vision may be blurry for 6-8 hours at near.    Return in 1 day for recheck to see Dr. Mayen at the [place] office.    Dario Vo, OD

## 2020-05-06 NOTE — TELEPHONE ENCOUNTER
Due to her symptoms and history I would advise a face to face visit.  I suspect bacterial keratitis or infiltrates due to contact lenses use in patient with incomplete lid closure.  Jody Ordaz, OD

## 2020-05-06 NOTE — TELEPHONE ENCOUNTER
Patient called- starting 4-27-20 she had a foreign body sensation in her right eye - she used artificial tears throughout the day, but it did not resolve.  The next day her right upper eyelid was almost swollen shut and her right eye hurt so she was seen in the ER.  She was told she had a cut on her upper eyelid and a scratch on her right eye.  Her discomfort has continued,but now her right eye is very photophobic, red, and watery.  Her upper eyelid remains swollen.  No mattering or mucous.  She is unable to go to work today due to her eye discomfort. She has been using ice packs and taking Tylenol.  She wears contact lenses.  I explained to her that I would contact the Doctor on Call and let her know the next step.  She approves a Telephone or Video Encounter.

## 2020-05-06 NOTE — LETTER
May 6, 2020      Lynne Sutlana  [address]  [place] [address]        To Whom It May Concern:    Lynne Sultana was seen on 5/6/2020 due to an eye problem.  Please excuse her  until 5/8/2020 due to a corneal ulcer.        Sincerely,        Dario Vo, SHANNON  [facility]

## 2020-05-06 NOTE — TELEPHONE ENCOUNTER
5/6/2020  Per KGM the patient will be seen today at 1:20PM   She was given the directions and told that at this time we are asking patient to wear a mask.     Babita Barrios Optometric Assistant

## 2020-05-06 NOTE — PATIENT INSTRUCTIONS
Besivance or substitute- 1 drop right eye every hour, erythromycin ointment at night.    A dilating eye drop was used today.  Your vision may be blurry for 6-8 hours at near.    Return in 1 day for recheck to see Dr. Mayen at the [place] office.    Dario Vo, OD

## 2020-05-07 ENCOUNTER — OFFICE VISIT (OUTPATIENT)
Dept: OPHTHALMOLOGY | Facility: CLINIC | Age: 35
End: 2020-05-07
Payer: COMMERCIAL

## 2020-05-07 DIAGNOSIS — H16.041 MARGINAL CORNEAL ULCER OF RIGHT EYE: Primary | ICD-10-CM

## 2020-05-07 PROCEDURE — 92002 INTRM OPH EXAM NEW PATIENT: CPT | Performed by: STUDENT IN AN ORGANIZED HEALTH CARE EDUCATION/TRAINING PROGRAM

## 2020-05-07 RX ORDER — OFLOXACIN 3 MG/ML
1 SOLUTION/ DROPS OPHTHALMIC
COMMUNITY
End: 2021-05-25

## 2020-05-07 ASSESSMENT — EXTERNAL EXAM - RIGHT EYE: OD_EXAM: NORMAL

## 2020-05-07 ASSESSMENT — VISUAL ACUITY
CORRECTION_TYPE: GLASSES
METHOD: SNELLEN - LINEAR
OD_PH_CC: 20/30
OS_CC: 20/20
OD_CC: 20/40

## 2020-05-07 ASSESSMENT — SLIT LAMP EXAM - LIDS: COMMENTS: NORMAL

## 2020-05-07 ASSESSMENT — EXTERNAL EXAM - LEFT EYE: OS_EXAM: NORMAL

## 2020-05-07 NOTE — LETTER
5/7/2020         RE: Lynne Sultana  [address]  [place] [address]        Dear Colleague,    Thank you for referring your patient, Lynne Sultana, to the [facility]. Please see a copy of my visit note below.     Current Eye Medications:  Ofloxacin right eye every hour.  erythromycin ointment at night.      Subjective:  Dr. Vo recommended a follow up visit regarding a corneal ulcer, right eye.  Right eye is feeling a little better and her eye is slightly less photophobic.  No new concerns or problems since yesterday.     Objective:  See Ophthalmology Exam.       Assessment:  Lynne Sultana is a 35 year old female who presents with:   Encounter Diagnosis   Name Primary?     Marginal corneal ulcer of right eye Improving a bit per patient's report.        Plan:  Continue Besifloxacin drops - 1 drop every hour in the right eye when awake     Continue erythromycin ointment in the right eye at bedtime     Continue to stay both eyes out of contact lenses for now    Return in 1 week for recheck (if symptoms worsening, please call to be seen sooner)    Radha Mayen MD  [phone]        Again, thank you for allowing me to participate in the care of your patient.        Sincerely,        Radha Mayen MD

## 2020-05-07 NOTE — PROGRESS NOTES
Current Eye Medications:  Ofloxacin right eye every hour.  erythromycin ointment at night.      Subjective:  Dr. Vo recommended a follow up visit regarding a corneal ulcer, right eye.  Right eye is feeling a little better and her eye is slightly less photophobic.  No new concerns or problems since yesterday.     Objective:  See Ophthalmology Exam.       Assessment:  Lynne Sultana is a 35 year old female who presents with:   Encounter Diagnosis   Name Primary?     Marginal corneal ulcer of right eye Improving a bit per patient's report.        Plan:  Continue Besifloxacin drops - 1 drop every hour in the right eye when awake     Continue erythromycin ointment in the right eye at bedtime     Continue to stay both eyes out of contact lenses for now    Return in 1 week for recheck (if symptoms worsening, please call to be seen sooner)    Radha Mayen MD  [phone]

## 2020-05-07 NOTE — PATIENT INSTRUCTIONS
Continue Besifloxacin drops - 1 drop every hour in the right eye when awake     Continue erythromycin ointment in the right eye at bedtime     Continue to stay both eyes out of contact lenses for now    Return in 1 week for recheck (if symptoms worsening, please call to be seen sooner)    Radha Mayen MD  [phone]

## 2020-05-11 ENCOUNTER — TELEPHONE (OUTPATIENT)
Dept: OPHTHALMOLOGY | Facility: CLINIC | Age: 35
End: 2020-05-11

## 2020-05-11 NOTE — TELEPHONE ENCOUNTER
Melanie was seen by Dr. Mayen last Thursday. She is indicating she can not see due to her condition. She is asking for a  Note for three day this week. Please have this letter sent to: [email or web address] Any questions please call [phone].

## 2020-05-14 ENCOUNTER — OFFICE VISIT (OUTPATIENT)
Dept: OPHTHALMOLOGY | Facility: CLINIC | Age: 35
End: 2020-05-14
Payer: COMMERCIAL

## 2020-05-14 DIAGNOSIS — H16.041 MARGINAL CORNEAL ULCER OF RIGHT EYE: Primary | ICD-10-CM

## 2020-05-14 PROCEDURE — 92012 INTRM OPH EXAM EST PATIENT: CPT | Performed by: STUDENT IN AN ORGANIZED HEALTH CARE EDUCATION/TRAINING PROGRAM

## 2020-05-14 RX ORDER — MOXIFLOXACIN 5 MG/ML
1 SOLUTION/ DROPS OPHTHALMIC
Qty: 1 BOTTLE | Refills: 4 | Status: SHIPPED | OUTPATIENT
Start: 2020-05-14 | End: 2021-05-25

## 2020-05-14 RX ORDER — ERYTHROMYCIN 5 MG/G
0.5 OINTMENT OPHTHALMIC AT BEDTIME
Qty: 1 TUBE | Refills: 6 | Status: SHIPPED | OUTPATIENT
Start: 2020-05-14 | End: 2021-05-25

## 2020-05-14 ASSESSMENT — VISUAL ACUITY
OD_CC: 20/40
METHOD: SNELLEN - LINEAR
OD_PH_CC: 20/30
CORRECTION_TYPE: GLASSES
OD_CC+: -2
OS_CC: 20/25

## 2020-05-14 ASSESSMENT — EXTERNAL EXAM - LEFT EYE: OS_EXAM: NORMAL

## 2020-05-14 ASSESSMENT — EXTERNAL EXAM - RIGHT EYE: OD_EXAM: NORMAL

## 2020-05-14 ASSESSMENT — TONOMETRY
IOP_METHOD: ICARE
OD_IOP_MMHG: 08

## 2020-05-14 ASSESSMENT — SLIT LAMP EXAM - LIDS: COMMENTS: NORMAL

## 2020-05-14 NOTE — PROGRESS NOTES
Current Eye Medications:  Ofloxacin right eye every hour.  Erythromycin ointment right eye every evening.       Subjective:  Follow up corneal ulcer right eye.  Right eye is still hurting a lot, especially when she is lying down at night.  Right eye feels like there is sand or gravel in it.  Her entire orbit is hurting.  She is extremely photophobic and her vision may be slightly worse.      Pharmacy substituted ofloxacin for besifloxacin.     Objective:  See Ophthalmology Exam.       Assessment:  Lynne Sultana is a 35 year old female who presents with:   Encounter Diagnosis   Name Primary?     Marginal corneal ulcer of right eye Stable - little improvement.       Plan:  Switch to moxifloxacin every hour in the right eye     Continue erythromycin ointment at bedtime in the right eye     Return on Monday for recheck (intraocular pressure check with [facility]are)    Radha Mayen MD  [phone]

## 2020-05-14 NOTE — PATIENT INSTRUCTIONS
Switch to moxifloxacin every hour in the right eye     Continue erythromycin ointment at bedtime in the right eye     Return on Monday for recheck (intraocular pressure check with [facility])    Radha Mayen MD  [phone]

## 2020-05-14 NOTE — LETTER
5/14/2020         RE: Lynne Sultana  [address]  [place] [address]        Dear Colleague,    Thank you for referring your patient, Lynne Sultana, to the [facility]. Please see a copy of my visit note below.     Current Eye Medications:  Ofloxacin right eye every hour.  Erythromycin ointment right eye every evening.       Subjective:  Follow up corneal ulcer right eye.  Right eye is still hurting a lot, especially when she is lying down at night.  Right eye feels like there is sand or gravel in it.  Her entire orbit is hurting.  She is extremely photophobic and her vision may be slightly worse.      Pharmacy substituted ofloxacin for besifloxacin.     Objective:  See Ophthalmology Exam.       Assessment:  Lynne Sultana is a 35 year old female who presents with:   Encounter Diagnosis   Name Primary?     Marginal corneal ulcer of right eye Stable - little improvement.       Plan:  Switch to moxifloxacin every hour in the right eye     Continue erythromycin ointment at bedtime in the right eye     Return on Monday for recheck (intraocular pressure check with [facility])    Radha Mayen MD  [phone]        Again, thank you for allowing me to participate in the care of your patient.        Sincerely,        Radha Mayen MD

## 2020-05-18 ENCOUNTER — OFFICE VISIT (OUTPATIENT)
Dept: OPHTHALMOLOGY | Facility: CLINIC | Age: 35
End: 2020-05-18
Payer: COMMERCIAL

## 2020-05-18 DIAGNOSIS — H16.041 MARGINAL CORNEAL ULCER OF RIGHT EYE: Primary | ICD-10-CM

## 2020-05-18 PROCEDURE — 92012 INTRM OPH EXAM EST PATIENT: CPT | Performed by: STUDENT IN AN ORGANIZED HEALTH CARE EDUCATION/TRAINING PROGRAM

## 2020-05-18 RX ORDER — POLYMYXIN B SULFATE AND TRIMETHOPRIM 1; 10000 MG/ML; [USP'U]/ML
1-2 SOLUTION OPHTHALMIC
Qty: 1 BOTTLE | Refills: 4 | Status: SHIPPED | OUTPATIENT
Start: 2020-05-18 | End: 2021-05-25

## 2020-05-18 ASSESSMENT — EXTERNAL EXAM - LEFT EYE: OS_EXAM: NORMAL

## 2020-05-18 ASSESSMENT — SLIT LAMP EXAM - LIDS: COMMENTS: NORMAL

## 2020-05-18 ASSESSMENT — VISUAL ACUITY
OD_CC+: -1
OD_PH_CC: 20/40
CORRECTION_TYPE: GLASSES
METHOD: SNELLEN - LINEAR
OD_CC: 20/40

## 2020-05-18 ASSESSMENT — EXTERNAL EXAM - RIGHT EYE: OD_EXAM: NORMAL

## 2020-05-18 ASSESSMENT — TONOMETRY
IOP_METHOD: ICARE
OD_IOP_MMHG: 09

## 2020-05-18 NOTE — LETTER
[address] [id number] [phone]      To Whom It May Concern:    Lynne Sultana was seen again in the eye clinic on May 18, 2020.   Please excuse them from work for time missed due to her eye condition through 5/24/2020.    Please call with any questions or concerns.    Sincerely,        Radha Mayen MD  [phone]

## 2020-05-18 NOTE — PROGRESS NOTES
Current Eye Medications:  Vigamox right eye every hour, erythromycin ointment every evening.       Subjective:  Follow up marginal corneal ulcer right eye.  Patient is here for a pressure check.  Symptoms are still present, and very slightly improved occasionally.  She is able to lie flat a little better, but wakes every couple hours.   She is wearing a black elastic patch to help with photophobia.  The pain comes in waves.  The drops ran out last night.     Objective:  See Ophthalmology Exam.       Assessment:  Lynne Sultana is a 35 year old female who presents with:   Encounter Diagnosis   Name Primary?     Marginal corneal ulcer of right eye Improved epithelial defect. Infiltrate slightly less dense.        Plan:  Medications for the right eye:    Continue Vigamox every hour while awake    Add Polytrim every two hours while awake (space drops at least 5 minutes apart)    Continue erythromycin ointment at bedtime     Return for recheck on Friday (sooner as needed)    Radha Mayen MD  [phone]

## 2020-05-18 NOTE — PATIENT INSTRUCTIONS
Medications for the right eye:    Continue Vigamox every hour while awake    Add Polytrim every two hours while awake (space drops at least 5 minutes apart)    Continue erythromycin ointment at bedtime     Return for recheck on Friday (sooner as needed)    Radha Mayen MD  [phone]

## 2020-05-18 NOTE — LETTER
5/18/2020         RE: Lynne Sultana  [address]  [place] [address]        Dear Colleague,    Thank you for referring your patient, Lynne Sultana, to the [facility]. Please see a copy of my visit note below.     Current Eye Medications:  Vigamox right eye every hour, erythromycin ointment every evening.       Subjective:  Follow up marginal corneal ulcer right eye.  Patient is here for a pressure check.  Symptoms are still present, and very slightly improved occasionally.  She is able to lie flat a little better, but wakes every couple hours.   She is wearing a black elastic patch to help with photophobia.  The pain comes in waves.  The drops ran out last night.     Objective:  See Ophthalmology Exam.       Assessment:  Lynne Sultana is a 35 year old female who presents with:   Encounter Diagnosis   Name Primary?     Marginal corneal ulcer of right eye Improved epithelial defect. Infiltrate slightly less dense.        Plan:  Medications for the right eye:    Continue Vigamox every hour while awake    Add Polytrim every two hours while awake (space drops at least 5 minutes apart)    Continue erythromycin ointment at bedtime     Return for recheck on Friday (sooner as needed)    Radha Mayen MD  [phone]        Again, thank you for allowing me to participate in the care of your patient.        Sincerely,        Radha Mayen MD

## 2020-09-09 ENCOUNTER — TELEPHONE (OUTPATIENT)
Dept: FAMILY MEDICINE | Facility: CLINIC | Age: 35
End: 2020-09-09

## 2020-09-09 NOTE — TELEPHONE ENCOUNTER
Reason for Call:  Other / Doctor's note    Detailed comments: Patient called and requested her PCP to write a note stating she cannot work with COVID19 patients due to her cardio medical condition. Patient also stated the note should also indicate she cannot wear A-95 masks due to her cardio medical condition as well.  Patient requested the note sent to her at: [email or web address]  Patient also stated she is available for any further info if required.    Phone Number Patient can be reached at: Home number on file [phone] (home)    Best Time: ASAP    Can we leave a detailed message on this number? YES    Call taken on 9/9/2020 at 2:45 PM by Alaina Ely

## 2020-09-16 NOTE — TELEPHONE ENCOUNTER
Attempt # 1  Called [phone] (home)     Did patient answer the phone: No, left a message on voicemail to return call to triage line at [phone].    Makenna CortezRN,BSN  [facility]

## 2020-09-16 NOTE — TELEPHONE ENCOUNTER
Patient returned call - left voice mail.     Call back at [phone]      Nurse called Lynne back. Relayed messages from providers below. Patient states will put a hold on this for now as has not been called back to work yet was being proactive. Declined to schedule telephone visit.  No further action necessary at this time.        Emerita Chavez RN  Triage Nurse  [facility]  Appointment line: [phone]  [facility], 24 hour nurse line, available by calling clinic at [phone] and following prompts.

## 2021-02-15 ENCOUNTER — OFFICE VISIT (OUTPATIENT)
Dept: FAMILY MEDICINE | Facility: CLINIC | Age: 36
End: 2021-02-15
Payer: COMMERCIAL

## 2021-02-15 VITALS
BODY MASS INDEX: 27.82 KG/M2 | SYSTOLIC BLOOD PRESSURE: 122 MMHG | DIASTOLIC BLOOD PRESSURE: 80 MMHG | WEIGHT: 157 LBS | TEMPERATURE: 98 F | HEART RATE: 100 BPM | HEIGHT: 63 IN

## 2021-02-15 DIAGNOSIS — I49.3 PVC'S (PREMATURE VENTRICULAR CONTRACTIONS): ICD-10-CM

## 2021-02-15 DIAGNOSIS — Q21.3 FALLOT TETRALOGY: Primary | ICD-10-CM

## 2021-02-15 PROCEDURE — 99212 OFFICE O/P EST SF 10 MIN: CPT | Performed by: NURSE PRACTITIONER

## 2021-02-15 ASSESSMENT — MIFFLIN-ST. JEOR: SCORE: 1381.28

## 2021-02-15 NOTE — PROGRESS NOTES
"    Assessment & Plan     Fallot tetralogy  Extensive cardiac history; form filled out through the date of her cardiology appointment (April 2021) and they can decide to extend it at that point or release her back to work if she is able to get vaccinated for COVID.     PVC's (premature ventricular contractions)           BMI:   Estimated body mass index is 27.81 kg/m  as calculated from the following:    Height as of this encounter: 1.6 m (5' 3\").    Weight as of this encounter: 71.2 kg (157 lb).       See Patient Instructions    Return if symptoms worsen or fail to improve.    DESTINEE Maki CNP  [facility]    Claudette Batista is a 35 year old who presents for the following health issues     HPI       Patient has a form for unemployment that she needs completed stating that she is unable to work due to covid.     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 She has an extensive cardiac history including Fallot tetralogy, PVCs, AICD; sees cardiology yearly. Next appointment 04/21/21. She is hoping to get the vaccine and then be able to go back to work. Was told she is eligible for unemployment. Brought form in to fill out.     Review of Systems   All other systems reviewed and are negative.           Objective    /80 (BP Location: Right arm, Cuff Size: Adult Regular)   Pulse 100   Temp 98  F (36.7  C) (Tympanic)   Ht 1.6 m (5' 3\")   Wt 71.2 kg (157 lb)   BMI 27.81 kg/m    Body mass index is 27.81 kg/m .  Physical Exam  Vitals signs and nursing note reviewed.   Constitutional:       General: She is not in acute distress.     Appearance: Normal appearance. She is not ill-appearing or toxic-appearing.   Cardiovascular:      Rate and Rhythm: Normal rate and regular rhythm.      Pulses: Normal pulses.      Comments: Valve heard  Pulmonary:      Effort: " Pulmonary effort is normal.      Breath sounds: Normal breath sounds.   Neurological:      Mental Status: She is alert.

## 2021-03-17 DIAGNOSIS — Z30.8 ENCOUNTER FOR OTHER CONTRACEPTIVE MANAGEMENT: ICD-10-CM

## 2021-03-19 RX ORDER — ACETAMINOPHEN AND CODEINE PHOSPHATE 120; 12 MG/5ML; MG/5ML
0.35 SOLUTION ORAL DAILY
Qty: 84 TABLET | Refills: 0 | Status: SHIPPED | OUTPATIENT
Start: 2021-03-19 | End: 2021-05-25

## 2021-04-21 ENCOUNTER — TRANSFERRED RECORDS (OUTPATIENT)
Dept: HEALTH INFORMATION MANAGEMENT | Facility: CLINIC | Age: 36
End: 2021-04-21

## 2021-04-21 LAB — EJECTION FRACTION: 57 %

## 2021-04-22 ENCOUNTER — TRANSFERRED RECORDS (OUTPATIENT)
Dept: HEALTH INFORMATION MANAGEMENT | Facility: CLINIC | Age: 36
End: 2021-04-22

## 2021-05-04 ENCOUNTER — IMMUNIZATION (OUTPATIENT)
Dept: FAMILY MEDICINE | Facility: CLINIC | Age: 36
End: 2021-05-04
Payer: COMMERCIAL

## 2021-05-04 PROCEDURE — 0011A PR COVID VAC MODERNA 100 MCG/0.5 ML IM: CPT

## 2021-05-04 PROCEDURE — 91301 PR COVID VAC MODERNA 100 MCG/0.5 ML IM: CPT

## 2021-05-25 ENCOUNTER — OFFICE VISIT (OUTPATIENT)
Dept: FAMILY MEDICINE | Facility: CLINIC | Age: 36
End: 2021-05-25
Payer: COMMERCIAL

## 2021-05-25 VITALS
WEIGHT: 159.2 LBS | HEIGHT: 63 IN | OXYGEN SATURATION: 100 % | DIASTOLIC BLOOD PRESSURE: 86 MMHG | TEMPERATURE: 98.7 F | BODY MASS INDEX: 28.21 KG/M2 | HEART RATE: 89 BPM | SYSTOLIC BLOOD PRESSURE: 126 MMHG

## 2021-05-25 DIAGNOSIS — Z30.8 ENCOUNTER FOR OTHER CONTRACEPTIVE MANAGEMENT: ICD-10-CM

## 2021-05-25 DIAGNOSIS — D22.9 NEVUS: ICD-10-CM

## 2021-05-25 DIAGNOSIS — Z11.59 NEED FOR HEPATITIS C SCREENING TEST: ICD-10-CM

## 2021-05-25 DIAGNOSIS — Z11.4 SCREENING FOR HIV (HUMAN IMMUNODEFICIENCY VIRUS): ICD-10-CM

## 2021-05-25 DIAGNOSIS — Z00.00 ROUTINE GENERAL MEDICAL EXAMINATION AT A HEALTH CARE FACILITY: Primary | ICD-10-CM

## 2021-05-25 DIAGNOSIS — I10 HYPERTENSION GOAL BP (BLOOD PRESSURE) < 140/90: ICD-10-CM

## 2021-05-25 PROCEDURE — 99395 PREV VISIT EST AGE 18-39: CPT | Performed by: PHYSICIAN ASSISTANT

## 2021-05-25 RX ORDER — ACETAMINOPHEN AND CODEINE PHOSPHATE 120; 12 MG/5ML; MG/5ML
0.35 SOLUTION ORAL DAILY
Qty: 84 TABLET | Refills: 4 | Status: SHIPPED | OUTPATIENT
Start: 2021-05-25 | End: 2022-07-22

## 2021-05-25 ASSESSMENT — ENCOUNTER SYMPTOMS
FEVER: 0
CONSTIPATION: 0
DIARRHEA: 0
COUGH: 0
ABDOMINAL PAIN: 0
HEMATURIA: 0
CHILLS: 0
EYE PAIN: 0
NERVOUS/ANXIOUS: 0
FREQUENCY: 0
HEMATOCHEZIA: 0
DIZZINESS: 0

## 2021-05-25 ASSESSMENT — MIFFLIN-ST. JEOR: SCORE: 1389.26

## 2021-05-25 NOTE — PROGRESS NOTES
SUBJECTIVE:   CC: Lynne Sultana is an 36 year old woman who presents for preventive health visit.       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     Mole- irritated when touched. Looks ugly. On the back of the neck.     Blood pressure slightly high at the [place].     Today's PHQ-2 Score:   PHQ-2 ( 1999 Pfizer) 5/25/2021   Q1: Little interest or pleasure in doing things 0   Q2: Feeling down, depressed or hopeless 0   PHQ-2 Score 0   Q1: Little interest or pleasure in doing things Not at all   Q2: Feeling down, depressed or hopeless Not at all   PHQ-2 Score 0       Abuse: Current or Past (Physical, Sexual or Emotional) - No  Do you feel safe in your environment? Yes    Have you ever done Advance Care Planning? (For example, a Health Directive, POLST, or a discussion with a medical provider or your loved ones about your wishes): Yes, patient states has an Advance Care Planning document and will bring a copy to the clinic.    Social History     Tobacco Use     Smoking status: Never Smoker     Smokeless tobacco: Never Used   Substance Use Topics     Alcohol use: Yes     Comment: sometimes     If you drink alcohol do you typically have >3 drinks per day or >7 drinks per week? No    Alcohol Use 5/25/2021   Prescreen: >3 drinks/day or >7 drinks/week? Not Applicable   Prescreen: >3 drinks/day or >7 drinks/week? -       Reviewed orders with patient.  Reviewed health maintenance and updated orders  "accordingly - Yes  Labs reviewed in EPIC    Breast Cancer Screening:    Breast CA Risk Assessment (FHS-7) 5/25/2021   Do you have a family history of breast, colon, or ovarian cancer? No / Unknown       click delete button to remove this line now  Patient under 40 years of age: Routine Mammogram Screening not recommended.   Pertinent mammograms are reviewed under the imaging tab.    History of abnormal Pap smear: NO - age 30- 65 PAP every 3 years recommended  PAP / HPV Latest Ref Rng & Units 2/7/2020 12/28/2018 11/20/2015   PAP - NIL NIL NIL   HPV 16 DNA NEG:Negative Negative Negative -   HPV 18 DNA NEG:Negative Negative Negative -   OTHER HR HPV NEG:Negative Negative Positive(A) -     Reviewed and updated as needed this visit by clinical staff  Tobacco  Allergies  Meds  Problems  Med Hx  Surg Hx  Fam Hx  Soc Hx          Reviewed and updated as needed this visit by Provider  Tobacco  Allergies  Meds  Problems  Med Hx  Surg Hx  Fam Hx             Review of Systems   Constitutional: Negative for chills and fever.   HENT: Negative for congestion and ear pain.    Eyes: Negative for pain.   Respiratory: Negative for cough.    Cardiovascular: Negative for chest pain.   Gastrointestinal: Negative for abdominal pain, constipation, diarrhea and hematochezia.   Genitourinary: Negative for frequency, genital sores and hematuria.   Neurological: Negative for dizziness.   Psychiatric/Behavioral: The patient is not nervous/anxious.         OBJECTIVE:   /86   Pulse 89   Temp 98.7  F (37.1  C) (Oral)   Ht 1.605 m (5' 3.19\")   Wt 72.2 kg (159 lb 3.2 oz)   SpO2 100%   Breastfeeding No   BMI 28.03 kg/m    Physical Exam  GENERAL: healthy, alert and no distress  EYES: Eyes grossly normal to inspection, PERRL and conjunctivae and sclerae normal  HENT: ear canals and TM's normal, nose and mouth without ulcers or lesions  NECK: no adenopathy, no asymmetry, masses, or scars and thyroid normal to palpation  RESP: " "lungs clear to auscultation - no rales, rhonchi or wheezes  BREAST: normal without masses, tenderness or nipple discharge and no palpable axillary masses or adenopathy  CV: regular rate and rhythm, normal S1 S2, no S3 or S4, no murmur, click or rub, no peripheral edema and peripheral pulses strong  ABDOMEN: soft, nontender, no hepatosplenomegaly, no masses and bowel sounds normal  MS: no gross musculoskeletal defects noted, no edema  SKIN: posterior neck-raised, uniform in color, good bordered nevus.   NEURO: Normal strength and tone, mentation intact and speech normal  PSYCH: mentation appears normal, affect normal/bright    Diagnostic Test Results:  none     ASSESSMENT/PLAN:   1. Routine general medical examination at a health care facility    2. Screening for HIV (human immunodeficiency virus)  Patient declined.     3. Need for hepatitis C screening test  Patient declined.     5. Encounter for other contraceptive management  Refilled.   - norethindrone (MICRONOR) 0.35 MG tablet; Take 1 tablet (0.35 mg) by mouth daily  Dispense: 84 tablet; Refill: 4    6. Hypertension goal BP (blood pressure) < 140/90  Stable on exam today, monitor at home and report back to cardiology if needed.     7. Nevus  Ok for removal, 40 min time slot. Discussed shave biopsy.       Patient has been advised of split billing requirements and indicates understanding: Yes  COUNSELING:  Reviewed preventive health counseling, as reflected in patient instructions       Regular exercise       Healthy diet/nutrition       Consider Hep C screening for all patients one time for ages 18-79 years       HIV screeninx in teen years, 1x in adult years, and at intervals if high risk    Estimated body mass index is 28.03 kg/m  as calculated from the following:    Height as of this encounter: 1.605 m (5' 3.19\").    Weight as of this encounter: 72.2 kg (159 lb 3.2 oz).    Weight management plan: Discussed healthy diet and exercise guidelines    She " reports that she has never smoked. She has never used smokeless tobacco.      Counseling Resources:  ATP IV Guidelines  Pooled Cohorts Equation Calculator  Breast Cancer Risk Calculator  BRCA-Related Cancer Risk Assessment: FHS-7 Tool  FRAX Risk Assessment  ICSI Preventive Guidelines  Dietary Guidelines for Americans, 2010  USDA's MyPlate  ASA Prophylaxis  Lung CA Screening    MERLY Jessica [facility]

## 2021-05-25 NOTE — PATIENT INSTRUCTIONS
Patient Education     Preventive Health Recommendations  Female Ages 26 - 39  Yearly exam:   See your health care provider every year in order to    Review health changes.     Discuss preventive care.      Review your medicines if you your doctor has prescribed any.    Until age 30: Get a Pap test every three years (more often if you have had an abnormal result).    After age 30: Talk to your doctor about whether you should have a Pap test every 3 years or have a Pap test with HPV screening every 5 years.   You do not need a Pap test if your uterus was removed (hysterectomy) and you have not had cancer.  You should be tested each year for STDs (sexually transmitted diseases), if you're at risk.   Talk to your provider about how often to have your cholesterol checked.  If you are at risk for diabetes, you should have a diabetes test (fasting glucose).  Shots: Get a flu shot each year. Get a tetanus shot every 10 years.   Nutrition:     Eat at least 5 servings of fruits and vegetables each day.    Eat whole-grain bread, whole-wheat pasta and brown rice instead of white grains and rice.    Get adequate Calcium and Vitamin D.     Lifestyle    Exercise at least 150 minutes a week (30 minutes a day, 5 days of the week). This will help you control your weight and prevent disease.    Limit alcohol to one drink per day.    No smoking.     Wear sunscreen to prevent skin cancer.    See your dentist every six months for an exam and cleaning.

## 2021-06-01 ENCOUNTER — IMMUNIZATION (OUTPATIENT)
Dept: FAMILY MEDICINE | Facility: CLINIC | Age: 36
End: 2021-06-01
Attending: FAMILY MEDICINE
Payer: COMMERCIAL

## 2021-06-01 PROCEDURE — 0012A PR COVID VAC MODERNA 100 MCG/0.5 ML IM: CPT

## 2021-06-01 PROCEDURE — 91301 PR COVID VAC MODERNA 100 MCG/0.5 ML IM: CPT

## 2021-06-09 ENCOUNTER — MEDICAL CORRESPONDENCE (OUTPATIENT)
Dept: HEALTH INFORMATION MANAGEMENT | Facility: CLINIC | Age: 36
End: 2021-06-09

## 2021-06-09 ENCOUNTER — TELEPHONE (OUTPATIENT)
Dept: FAMILY MEDICINE | Facility: CLINIC | Age: 36
End: 2021-06-09

## 2021-06-09 DIAGNOSIS — N64.89 BREAST ASYMMETRY: Primary | ICD-10-CM

## 2021-06-09 NOTE — TELEPHONE ENCOUNTER
Received a form from [employer].     Patient has never had a history of breast cancer. She notes a history of breast asymmetry wanting medical garments.     Per attached documentation this may not be covered in which case patient would need to pay cash.     Form completed.   Mel Dias PA-C

## 2021-06-24 ENCOUNTER — NURSE TRIAGE (OUTPATIENT)
Dept: NURSING | Facility: CLINIC | Age: 36
End: 2021-06-24

## 2021-06-24 NOTE — TELEPHONE ENCOUNTER
Patient says she is keeping track of her blood pressure for her cardiologist. She called b/c her BP was a bit higher when she checked it at [facility] --/103 pulse 85, and BP of 149/94 P 78. Patient reports her systolic has been between 145-160 and diastolic 86-99. [facility]    Disposition: be seen w/i 2 weeks.  Advised to call cardiologist tomorrow since they want tighter control of her BP.    Reviewed care advice with caller per RN triage protocol guideline.  Advised to call back with worsening symptoms, concerns or questions. Caller verbalized understanding.                Reason for Disposition    [1] Systolic BP  >= 130 OR Diastolic >= 80 AND [2] not taking BP medications    Additional Information    Negative: Difficult to awaken or acting confused (e.g., disoriented, slurred speech)    Negative: Severe difficulty breathing (e.g., struggling for each breath, speaks in single words)    Negative: [1] Weakness of the face, arm or leg on one side of the body AND [2] new onset    Negative: [1] Numbness (i.e., loss of sensation) of the face, arm or leg on one side of the body AND [2] new onset    Negative: [1] Chest pain lasts > 5 minutes AND [2] history of heart disease  (i.e., heart attack, bypass surgery, angina, angioplasty, CHF)    Negative: [1] Chest pain AND [2] took nitrogylcerin AND [3] pain was not relieved    Negative: Sounds like a life-threatening emergency to the triager    Negative: Symptom is main concern  (e.g., headache, chest pain)    Negative: Low blood pressure is main concern    Negative: [1] Systolic BP  >= 160 OR Diastolic >= 100 AND [2] cardiac or neurologic symptoms (e.g., chest pain, difficulty breathing, unsteady gait, blurred vision)    Negative: Ran out of BP medications    Negative: Systolic BP  >= 180 OR Diastolic >= 110    Negative: [1] Systolic BP  >= 180 OR Diastolic >= 110 AND [2] missed most recent dose of blood pressure  medication    Negative: [1] Systolic BP  >= 200 OR Diastolic >= 120  AND [2] having NO cardiac or neurologic symptoms    Negative: [1] Postpartum < 6 weeks AND [2] BP Systolic BP  >= 140 OR Diastolic >= 90    Negative: [1] Pregnant > 20 weeks (or postpartum < 6 weeks) AND [2] new hand or face swelling    Negative: [1] Pregnant > 20 weeks AND [2] BP Systolic BP  >= 140 OR Diastolic >= 90    Negative: Systolic BP  >= 160 OR Diastolic >= 100    Negative: [1] Taking BP medications AND [2] feels is having side effects (e.g., impotence, cough, dizzy upon standing)    Negative: [1] Systolic BP  >= 130 OR Diastolic >= 80 AND [2] pregnant    Negative: [1] Systolic BP  >= 130 OR Diastolic >= 80 AND [2] taking BP medications    Protocols used: HIGH BLOOD PRESSURE-A-AH

## 2021-08-06 ENCOUNTER — OFFICE VISIT (OUTPATIENT)
Dept: FAMILY MEDICINE | Facility: CLINIC | Age: 36
End: 2021-08-06
Payer: COMMERCIAL

## 2021-08-06 VITALS
TEMPERATURE: 98.3 F | SYSTOLIC BLOOD PRESSURE: 114 MMHG | WEIGHT: 154 LBS | HEART RATE: 78 BPM | DIASTOLIC BLOOD PRESSURE: 73 MMHG | BODY MASS INDEX: 27.12 KG/M2

## 2021-08-06 DIAGNOSIS — I10 HYPERTENSION GOAL BP (BLOOD PRESSURE) < 140/90: Primary | ICD-10-CM

## 2021-08-06 LAB
ANION GAP SERPL CALCULATED.3IONS-SCNC: 5 MMOL/L (ref 3–14)
BUN SERPL-MCNC: 16 MG/DL (ref 7–30)
CALCIUM SERPL-MCNC: 8.9 MG/DL (ref 8.5–10.1)
CHLORIDE BLD-SCNC: 105 MMOL/L (ref 94–109)
CO2 SERPL-SCNC: 30 MMOL/L (ref 20–32)
CREAT SERPL-MCNC: 0.7 MG/DL (ref 0.52–1.04)
GFR SERPL CREATININE-BSD FRML MDRD: >90 ML/MIN/1.73M2
GLUCOSE BLD-MCNC: 83 MG/DL (ref 70–99)
POTASSIUM BLD-SCNC: 3.7 MMOL/L (ref 3.4–5.3)
SODIUM SERPL-SCNC: 140 MMOL/L (ref 133–144)

## 2021-08-06 PROCEDURE — 36415 COLL VENOUS BLD VENIPUNCTURE: CPT | Performed by: PHYSICIAN ASSISTANT

## 2021-08-06 PROCEDURE — 80048 BASIC METABOLIC PNL TOTAL CA: CPT | Performed by: PHYSICIAN ASSISTANT

## 2021-08-06 PROCEDURE — 99213 OFFICE O/P EST LOW 20 MIN: CPT | Performed by: PHYSICIAN ASSISTANT

## 2021-08-06 RX ORDER — HYDROCHLOROTHIAZIDE 25 MG/1
25 TABLET ORAL DAILY
COMMUNITY
Start: 2021-07-29 | End: 2023-09-18

## 2021-08-06 NOTE — PROGRESS NOTES
Assessment & Plan     Hypertension goal BP (blood pressure) < 140/90  Managed at [facility], patient will mychart them her blood pressure's will get her BMP done at their request with the hydrochlorothiazide start. Patient to mychart with any concerns. Blood pressure normal here in clinic.   - Basic metabolic panel; Future  - Basic metabolic panel                 No follow-ups on file.    MERLY Jessica [facility] LORIE Batista is a 36 year old who presents for the following health issues     HPI     Hypertension Follow-up      Do you check your blood pressure regularly outside of the clinic? Yes     Are you following a low salt diet? Yes    Are your blood pressures ever more than 140 on the top number (systolic) OR more   than 90 on the bottom number (diastolic), for example 140/90? Yes      How many servings of fruits and vegetables do you eat daily?  2-3    On average, how many sweetened beverages do you drink each day (Examples: soda, juice, sweet tea, etc.  Do NOT count diet or artificially sweetened beverages)?   0    How many days per week do you exercise enough to make your heart beat faster? 3 or less    How many minutes a day do you exercise enough to make your heart beat faster? 9 or less    How many days per week do you miss taking your medication? 0    Please look at note from cardiologist for request in lab work to be completed.     Wears compression socks at work.   Had a high potassium in April at [facility].   Taking hydrochlorothiazide in the morning.   BP range in the 148-90 range and started hydrochlorothiazide down to high 130/90 range. New home cuff-arm  [facility] is managing her blood pressures.     Review of Systems   Constitutional, HEENT, cardiovascular, pulmonary, gi and gu systems are negative, except as otherwise noted.      Objective    /73 (BP Location: Right arm, Patient Position: Chair, Cuff Size: Adult Regular)   Pulse 78   Temp 98.3  F  (36.8  C) (Oral)   Wt 69.9 kg (154 lb)   Breastfeeding No   BMI 27.12 kg/m    Body mass index is 27.12 kg/m .  Physical Exam   GENERAL: healthy, alert and no distress  CV: regular rate and rhythm, loud systolic click noted. no peripheral edema

## 2021-08-06 NOTE — LETTER
August 9, 2021      Lynne Sultana  [address]  [place] [address]        Dear Ms.Rd,    We are writing to inform you of your test results.    Your potassium, electrolytes and kidney function are all normal.         Resulted Orders   Basic metabolic panel   Result Value Ref Range    Sodium 140 133 - 144 mmol/L    Potassium 3.7 3.4 - 5.3 mmol/L    Chloride 105 94 - 109 mmol/L    Carbon Dioxide (CO2) 30 20 - 32 mmol/L    Anion Gap 5 3 - 14 mmol/L    Urea Nitrogen 16 7 - 30 mg/dL    Creatinine 0.70 0.52 - 1.04 mg/dL    Calcium 8.9 8.5 - 10.1 mg/dL    Glucose 83 70 - 99 mg/dL    GFR Estimate >90 >60 mL/min/1.73m2      Comment:      As of July 11, 2021, eGFR is calculated by the CKD-EPI creatinine equation, without race adjustment. eGFR can be influenced by muscle mass, exercise, and diet. The reported eGFR is an estimation only and is only applicable if the renal function is stable.       If you have any questions or concerns, please call the clinic at the number listed above.       Sincerely,      Mel Dias PA-C/isabella

## 2021-09-12 ENCOUNTER — HEALTH MAINTENANCE LETTER (OUTPATIENT)
Age: 36
End: 2021-09-12

## 2022-02-01 ENCOUNTER — OFFICE VISIT (OUTPATIENT)
Dept: FAMILY MEDICINE | Facility: CLINIC | Age: 37
End: 2022-02-01
Payer: COMMERCIAL

## 2022-02-01 VITALS
HEIGHT: 63 IN | BODY MASS INDEX: 26.75 KG/M2 | OXYGEN SATURATION: 99 % | WEIGHT: 151 LBS | DIASTOLIC BLOOD PRESSURE: 76 MMHG | TEMPERATURE: 97.8 F | RESPIRATION RATE: 18 BRPM | HEART RATE: 75 BPM | SYSTOLIC BLOOD PRESSURE: 114 MMHG

## 2022-02-01 DIAGNOSIS — N36.8 URETHRAL IRRITATION: Primary | ICD-10-CM

## 2022-02-01 DIAGNOSIS — R30.0 DYSURIA: ICD-10-CM

## 2022-02-01 LAB
ALBUMIN UR-MCNC: NEGATIVE MG/DL
APPEARANCE UR: CLEAR
BILIRUB UR QL STRIP: NEGATIVE
COLOR UR AUTO: YELLOW
GLUCOSE UR STRIP-MCNC: NEGATIVE MG/DL
HGB UR QL STRIP: NEGATIVE
KETONES UR STRIP-MCNC: NEGATIVE MG/DL
LEUKOCYTE ESTERASE UR QL STRIP: NEGATIVE
NITRATE UR QL: NEGATIVE
PH UR STRIP: 5.5 [PH] (ref 5–7)
SP GR UR STRIP: <=1.005 (ref 1–1.03)
UROBILINOGEN UR STRIP-ACNC: 0.2 E.U./DL

## 2022-02-01 PROCEDURE — 99213 OFFICE O/P EST LOW 20 MIN: CPT | Performed by: NURSE PRACTITIONER

## 2022-02-01 PROCEDURE — 81003 URINALYSIS AUTO W/O SCOPE: CPT | Performed by: NURSE PRACTITIONER

## 2022-02-01 ASSESSMENT — MIFFLIN-ST. JEOR: SCORE: 1352.06

## 2022-02-01 NOTE — PATIENT INSTRUCTIONS
1.  If you develop any burning with urination on your trip take AZO over the counter until you get back to get checked.  I don't think this will be a problem.  2.  Urine is normal today.  Most likely your bubble bath causes urethral irritation.  3.  No bubble baths if this continues to cause symptoms.  4.  Push fluids.  5.  Follow-up as needed.

## 2022-02-01 NOTE — PROGRESS NOTES
Assessment & Plan     Urethral irritation  Urinalysis shows that is negative for blood and white blood cells.  Symptoms most likely urethral irritation from bubble bath since it occurred 2 days after this episode.  Discussed with patient that she should eliminate bubble baths to reduce symptoms as well as pushing lots of fluids.  Also discussed use of over-the-counter Azo as needed if any urethral irritation that causes pain.  Recommend follow-up if any worsening or persistent symptoms.    Dysuria  - UA macro with reflex to Microscopic and Culture - Clinc Collect    See Patient Instructions    Return if symptoms worsen or fail to improve.    Jessie Gonzalez NP  [facility] ANJUM Batista is a 36 year old who presents for the following health issues     HPI     Genitourinary - Female  Onset/Duration: about 2 weeks. She said she was in the Jacuzzi a couple weeks ago and started getting symptoms after that   Description:   Painful urination (Dysuria): no           Frequency: YES  Blood in urine (Hematuria): no  Delay in urine (Hesitency): YES  Intensity: moderate  Progression of Symptoms:  same and the odor has gotten worse  Accompanying Signs & Symptoms: having very odorous urine that is cloudy and dark especially in the morning  Fever/chills: no  Flank pain: YES  Nausea and vomiting: no  Vaginal symptoms: none  Abdominal/Pelvic Pain: YES  History:   History of frequent UTI s: YES- gets probably 1-2 a year  History of kidney stones: no  Sexually Active: YES  Possibility of pregnancy: No  Precipitating or alleviating factors: None  Therapies tried and outcome:  none     Pushes 1 Liter of water daily and then some more some days.  No vaginal symptoms. Recently had a bubble bath and states that symptoms started a day or two after this.  She usually will get a UTI once or twice a year.    Review of Systems   CONSTITUTIONAL: NEGATIVE for fever, chills, change in weight  : frequency and  "hesitancy  PSYCHIATRIC: NEGATIVE for changes in mood or affect      Objective    /76 (BP Location: Left arm, Patient Position: Sitting, Cuff Size: Adult Regular)   Pulse 75   Temp 97.8  F (36.6  C) (Tympanic)   Resp 18   Ht 1.605 m (5' 3.19\")   Wt 68.5 kg (151 lb)   SpO2 99%   Breastfeeding No   BMI 26.59 kg/m    Body mass index is 26.59 kg/m .  Physical Exam   GENERAL: healthy, alert and no distress  ABDOMEN: soft, nontender, no hepatosplenomegaly, no masses and bowel sounds normal  PSYCH: mentation appears normal, affect normal/bright         "

## 2022-03-22 ENCOUNTER — VIRTUAL VISIT (OUTPATIENT)
Dept: FAMILY MEDICINE | Facility: CLINIC | Age: 37
End: 2022-03-22
Payer: COMMERCIAL

## 2022-03-22 DIAGNOSIS — J00 ACUTE RHINITIS: Primary | ICD-10-CM

## 2022-03-22 PROCEDURE — 99213 OFFICE O/P EST LOW 20 MIN: CPT | Mod: 95 | Performed by: NURSE PRACTITIONER

## 2022-03-22 RX ORDER — MULTIVITAMIN
1 TABLET ORAL DAILY
COMMUNITY

## 2022-03-22 RX ORDER — BENAZEPRIL HYDROCHLORIDE 10 MG/1
1 TABLET ORAL DAILY
COMMUNITY
Start: 2022-03-17 | End: 2022-09-16

## 2022-03-22 RX ORDER — FOLIC ACID 0.8 MG
500 TABLET ORAL DAILY
COMMUNITY

## 2022-03-22 RX ORDER — FLUTICASONE PROPIONATE 50 MCG
2 SPRAY, SUSPENSION (ML) NASAL DAILY
Qty: 16 G | Refills: 1 | Status: SHIPPED | OUTPATIENT
Start: 2022-03-22

## 2022-03-22 NOTE — PROGRESS NOTES
"Lynne is a 37 year old who is being evaluated via a billable telephone visit.      What phone number would you like to be contacted at? [phone]  How would you like to obtain your AVS? Mail a copy    10:33 AM - 10:47 AM     Assessment & Plan     Acute rhinitis  Acute symptoms x1 day including sore throat, congestion, postnasal drainage, fatigue, laryngitis, nausea with one episode of emesis last evening and low-grade fever.  Had negative COVID-19 test last week, was retested yesterday at work, awaiting results.  Recommend awaiting test results, starting Flonase nasal spray, reviewed proper administration instructions with patient.  Also advised on restarting over-the-counter antihistamine such as Claritin or Zyrtec daily as well as over-the-counter Mucinex twice daily.  Encouraged increasing fluid intake, resting, elevating head of bed, use of a coolmist vaporizer as well as warm salt water gargles.  Advised should stay home from work today and possibly tomorrow if still running fevers.  Recommend if symptoms not improving or worsening to be seen in clinic.    - fluticasone (FLONASE) 50 MCG/ACT nasal spray; Spray 2 sprays into both nostrils daily             BMI:   Estimated body mass index is 26.59 kg/m  as calculated from the following:    Height as of 2/1/22: 1.605 m (5' 3.19\").    Weight as of 2/1/22: 68.5 kg (151 lb).       See Patient Instructions    Return in about 1 week (around 3/29/2022), or if symptoms worsen or fail to improve.    Teresita Ro, JEMAL, APRN-CNP   [facility]    Claudette Batista is a 37 year old who presents for the following health issues  accompanied by her Self.    HPI   Chief Complaint   Patient presents with     URI     Health Maintenance     reminded due for flu and covid - will get when feeling better.        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      Objective           Vitals:  No vitals were obtained today due to virtual visit.    Physical Exam   healthy, alert and no distress  PSYCH: Alert and oriented times 3; coherent speech, normal   rate and volume, able to articulate logical thoughts, able   to abstract reason, no tangential thoughts, no hallucinations   or delusions  Her affect is normal and pleasant  RESP: No cough, no audible wheezing, laryngitis notable able to talk in full sentences  Remainder of exam unable to be completed due to telephone visits    Diagnostic Test Results:  none  -patient awaiting COVID-19 test results        Phone call duration: 14 minutes    Chart documentation with Dragon Voice recognition Software. Although reviewed after completion, some words and grammatical errors may remain.

## 2022-03-22 NOTE — PATIENT INSTRUCTIONS
Acute rhinitis  Acute symptoms x1 day including sore throat, congestion, postnasal drainage, fatigue, laryngitis, nausea with one episode of emesis last evening and low-grade fever.  Had negative COVID-19 test last week, was retested yesterday at work, awaiting results.  Recommend awaiting test results, starting Flonase nasal spray, reviewed proper administration instructions with patient.  Also advised on restarting over-the-counter antihistamine such as Claritin or Zyrtec daily as well as over-the-counter Mucinex twice daily.  Encouraged increasing fluid intake, resting, elevating head of bed, use of a coolmist vaporizer as well as warm salt water gargles.  Advised should stay home from work today and possibly tomorrow if still running fevers.  Recommend if symptoms not improving or worsening to be seen in clinic.    - fluticasone (FLONASE) 50 MCG/ACT nasal spray; Spray 2 sprays into both nostrils daily

## 2022-07-22 DIAGNOSIS — Z30.8 ENCOUNTER FOR OTHER CONTRACEPTIVE MANAGEMENT: ICD-10-CM

## 2022-07-22 RX ORDER — NORETHINDRONE
KIT
Qty: 84 TABLET | Refills: 4 | Status: SHIPPED | OUTPATIENT
Start: 2022-07-22 | End: 2023-09-18

## 2022-07-22 NOTE — TELEPHONE ENCOUNTER
"Requested Prescriptions   Signed Prescriptions Disp Refills    NORLYDA 0.35 MG tablet 84 tablet 4     Sig: TAKE 1 TABLET(0.35 MG) BY MOUTH DAILY       Contraceptives Protocol Passed - 7/22/2022  3:33 AM        Passed - Patient is not a current smoker if age is 35 or older        Passed - Recent (12 mo) or future (30 days) visit within the authorizing provider's specialty     Patient has had an office visit with the authorizing provider or a provider within the authorizing providers department within the previous 12 mos or has a future within next 30 days. See \"Patient Info\" tab in inbasket, or \"Choose Columns\" in Meds & Orders section of the refill encounter.              Passed - Medication is active on med list        Passed - No active pregnancy on record        Passed - No positive pregnancy test in past 12 months           Carito Brown RN  [facility]     "

## 2022-08-14 ENCOUNTER — HEALTH MAINTENANCE LETTER (OUTPATIENT)
Age: 37
End: 2022-08-14

## 2022-09-16 ENCOUNTER — OFFICE VISIT (OUTPATIENT)
Dept: FAMILY MEDICINE | Facility: CLINIC | Age: 37
End: 2022-09-16
Payer: COMMERCIAL

## 2022-09-16 VITALS
BODY MASS INDEX: 25.86 KG/M2 | DIASTOLIC BLOOD PRESSURE: 62 MMHG | TEMPERATURE: 97.1 F | SYSTOLIC BLOOD PRESSURE: 124 MMHG | OXYGEN SATURATION: 99 % | HEIGHT: 64 IN | WEIGHT: 151.5 LBS | HEART RATE: 80 BPM

## 2022-09-16 DIAGNOSIS — Z30.8 ENCOUNTER FOR OTHER CONTRACEPTIVE MANAGEMENT: ICD-10-CM

## 2022-09-16 DIAGNOSIS — I10 HYPERTENSION GOAL BP (BLOOD PRESSURE) < 140/90: ICD-10-CM

## 2022-09-16 DIAGNOSIS — Z23 HIGH PRIORITY FOR 2019-NCOV VACCINE: ICD-10-CM

## 2022-09-16 DIAGNOSIS — Z00.00 ROUTINE GENERAL MEDICAL EXAMINATION AT A HEALTH CARE FACILITY: Primary | ICD-10-CM

## 2022-09-16 PROCEDURE — 91313 COVID-19,PF,MODERNA BIVALENT: CPT | Performed by: PHYSICIAN ASSISTANT

## 2022-09-16 PROCEDURE — 99395 PREV VISIT EST AGE 18-39: CPT | Performed by: PHYSICIAN ASSISTANT

## 2022-09-16 PROCEDURE — 0134A COVID-19,PF,MODERNA BIVALENT: CPT | Performed by: PHYSICIAN ASSISTANT

## 2022-09-16 RX ORDER — BENAZEPRIL HYDROCHLORIDE 10 MG/1
10 TABLET ORAL DAILY
Qty: 90 TABLET | Refills: 0 | Status: SHIPPED | OUTPATIENT
Start: 2022-09-16 | End: 2022-12-14

## 2022-09-16 ASSESSMENT — ENCOUNTER SYMPTOMS
MYALGIAS: 0
CONSTIPATION: 0
FREQUENCY: 0
COUGH: 0
DYSURIA: 0
ARTHRALGIAS: 0
PALPITATIONS: 0
NERVOUS/ANXIOUS: 0
SHORTNESS OF BREATH: 0
CHILLS: 0
HEARTBURN: 1
HEMATURIA: 0
PARESTHESIAS: 0
FEVER: 0
HEADACHES: 1
EYE PAIN: 0
DIARRHEA: 0
NAUSEA: 0
HEMATOCHEZIA: 0
WEAKNESS: 0
BREAST MASS: 0
SORE THROAT: 0
JOINT SWELLING: 0
DIZZINESS: 1
ABDOMINAL PAIN: 0

## 2022-09-16 NOTE — PROGRESS NOTES
SUBJECTIVE:   CC: Lynne is an 37 year old who presents for preventive health visit.       Patient has been advised of split billing requirements and indicates understanding: Yes  Healthy Habits:     Getting at least 3 servings of Calcium per day:  Yes    Bi-annual eye exam:  Yes    Dental care twice a year:  Yes    Sleep apnea or symptoms of sleep apnea:  None    Diet:  Low salt    Frequency of exercise:  1 day/week    Duration of exercise:  15-30 minutes    Taking medications regularly:  Yes    Medication side effects:  None    PHQ-2 Total Score: 0    Additional concerns today:  No    Follow up with [facility] next month. Needs a refill in the [facility] for blood pressure meds.   Happy with contraception.   No concerns.       Today's PHQ-2 Score:   PHQ-2 ( 1999 Pfizer) 9/16/2022   Q1: Little interest or pleasure in doing things 0   Q2: Feeling down, depressed or hopeless 0   PHQ-2 Score 0   PHQ-2 Total Score (12-17 Years)- Positive if 3 or more points; Administer PHQ-A if positive -   Q1: Little interest or pleasure in doing things Not at all   Q2: Feeling down, depressed or hopeless Not at all   PHQ-2 Score 0       Abuse: Current or Past (Physical, Sexual or Emotional) - No  Do you feel safe in your environment? Yes        Social History     Tobacco Use     Smoking status: Never Smoker     Smokeless tobacco: Never Used   Substance Use Topics     Alcohol use: Yes     Comment: sometimes     If you drink alcohol do you typically have >3 drinks per day or >7 drinks per week? No    Alcohol Use 9/16/2022   Prescreen: >3 drinks/day or >7 drinks/week? No   Prescreen: >3 drinks/day or >7 drinks/week? -       Reviewed orders with patient.  Reviewed health maintenance and updated orders accordingly - Yes      Breast Cancer Screening:    Breast CA Risk Assessment (FHS-7) 5/25/2021   Do you have a family history of breast, colon, or ovarian cancer? No / Unknown       click delete button to remove this line now  Patient under 40  "years of age: Routine Mammogram Screening not recommended.   Pertinent mammograms are reviewed under the imaging tab.    History of abnormal Pap smear: NO - age 30- 65 PAP every 3 years recommended  PAP / HPV Latest Ref Rng & Units 2/7/2020 12/28/2018 11/20/2015   PAP (Historical) - NIL NIL NIL   HPV16 NEG:Negative Negative Negative -   HPV18 NEG:Negative Negative Negative -   HRHPV NEG:Negative Negative Positive(A) -     Reviewed and updated as needed this visit by clinical staff   Tobacco  Allergies  Meds  Problems  Med Hx  Surg Hx  Fam Hx  Soc   Hx          Reviewed and updated as needed this visit by Provider   Tobacco  Allergies  Meds  Problems  Med Hx  Surg Hx  Fam Hx               Review of Systems   Constitutional: Negative for chills and fever.   HENT: Negative for congestion, ear pain, hearing loss and sore throat.    Eyes: Negative for pain and visual disturbance.   Respiratory: Negative for cough and shortness of breath.    Cardiovascular: Negative for chest pain, palpitations and peripheral edema.   Gastrointestinal: Positive for heartburn. Negative for abdominal pain, constipation, diarrhea, hematochezia and nausea.   Breasts:  Negative for tenderness, breast mass and discharge.   Genitourinary: Negative for dysuria, frequency, genital sores, hematuria, pelvic pain, urgency, vaginal bleeding and vaginal discharge.   Musculoskeletal: Negative for arthralgias, joint swelling and myalgias.   Skin: Negative for rash.   Neurological: Positive for dizziness and headaches. Negative for weakness and paresthesias.   Psychiatric/Behavioral: Negative for mood changes. The patient is not nervous/anxious.           OBJECTIVE:   /62 (BP Location: Left arm, Patient Position: Chair, Cuff Size: Adult Regular)   Pulse 80   Temp 97.1  F (36.2  C) (Temporal)   Ht 1.613 m (5' 3.5\")   Wt 68.7 kg (151 lb 8 oz)   SpO2 99%   Breastfeeding No   BMI 26.41 kg/m    Physical Exam  GENERAL: healthy, alert " "and no distress  EYES: Eyes grossly normal to inspection, PERRL and conjunctivae and sclerae normal  HENT: ear canals and TM's normal, nose and mouth without ulcers or lesions  NECK: no adenopathy, no asymmetry, masses, or scars and thyroid normal to palpation  RESP: lungs clear to auscultation - no rales, rhonchi or wheezes  CV: regular rate and rhythm, 4/6 holosystolic murmur  ABDOMEN: soft, nontender, no hepatosplenomegaly, no masses and bowel sounds normal  MS: no gross musculoskeletal defects noted, no edema  SKIN: no suspicious lesions or rashes  NEURO: Normal strength and tone, mentation intact and speech normal  PSYCH: mentation appears normal, affect normal/bright    Diagnostic Test Results:  none     ASSESSMENT/PLAN:   (Z00.00) Routine general medical examination at a health care facility  (primary encounter diagnosis)  Comment:   Plan:     (Z30.8) Encounter for other contraceptive management  Comment:   Plan: continue OCP. No changes.     (I10) Hypertension goal BP (blood pressure) < 140/90  Comment:   Plan: benazepril (LOTENSIN) 10 MG tablet        Refilled. Can take over refills if needed from [facility].     (Z23) High priority for 2019-nCoV vaccine  Comment:   Plan: COVID-19,PF,MODERNA BIVALENT 18+Yrs                  COUNSELING:  Reviewed preventive health counseling, as reflected in patient instructions       Regular exercise       Healthy diet/nutrition       Contraception    Estimated body mass index is 26.41 kg/m  as calculated from the following:    Height as of this encounter: 1.613 m (5' 3.5\").    Weight as of this encounter: 68.7 kg (151 lb 8 oz).    Weight management plan: Discussed healthy diet and exercise guidelines    She reports that she has never smoked. She has never used smokeless tobacco.      Counseling Resources:  ATP IV Guidelines  Pooled Cohorts Equation Calculator  Breast Cancer Risk Calculator  BRCA-Related Cancer Risk Assessment: FHS-7 Tool  FRAX Risk Assessment  ICSI Preventive " Guidelines  Dietary Guidelines for Americans, 2010  USDA's MyPlate  ASA Prophylaxis  Lung CA Screening    MERLY Jessica [facility]

## 2022-11-19 ENCOUNTER — HEALTH MAINTENANCE LETTER (OUTPATIENT)
Age: 37
End: 2022-11-19

## 2022-12-14 DIAGNOSIS — I10 HYPERTENSION GOAL BP (BLOOD PRESSURE) < 140/90: ICD-10-CM

## 2022-12-14 RX ORDER — BENAZEPRIL HYDROCHLORIDE 10 MG/1
TABLET ORAL
Qty: 90 TABLET | Refills: 0 | Status: SHIPPED | OUTPATIENT
Start: 2022-12-14 | End: 2023-03-13

## 2023-03-13 DIAGNOSIS — I10 HYPERTENSION GOAL BP (BLOOD PRESSURE) < 140/90: ICD-10-CM

## 2023-03-13 RX ORDER — BENAZEPRIL HYDROCHLORIDE 10 MG/1
TABLET ORAL
Qty: 90 TABLET | Refills: 0 | Status: SHIPPED | OUTPATIENT
Start: 2023-03-13 | End: 2023-06-12

## 2023-06-11 DIAGNOSIS — I10 HYPERTENSION GOAL BP (BLOOD PRESSURE) < 140/90: ICD-10-CM

## 2023-06-12 RX ORDER — BENAZEPRIL HYDROCHLORIDE 10 MG/1
TABLET ORAL
Qty: 90 TABLET | Refills: 0 | Status: SHIPPED | OUTPATIENT
Start: 2023-06-12 | End: 2023-09-11

## 2023-09-09 DIAGNOSIS — I10 HYPERTENSION GOAL BP (BLOOD PRESSURE) < 140/90: ICD-10-CM

## 2023-09-11 RX ORDER — BENAZEPRIL HYDROCHLORIDE 10 MG/1
TABLET ORAL
Qty: 90 TABLET | Refills: 0 | Status: SHIPPED | OUTPATIENT
Start: 2023-09-11 | End: 2023-09-18

## 2023-09-18 ENCOUNTER — OFFICE VISIT (OUTPATIENT)
Dept: FAMILY MEDICINE | Facility: CLINIC | Age: 38
End: 2023-09-18
Payer: COMMERCIAL

## 2023-09-18 VITALS
TEMPERATURE: 99 F | DIASTOLIC BLOOD PRESSURE: 77 MMHG | OXYGEN SATURATION: 100 % | HEIGHT: 63 IN | BODY MASS INDEX: 27.68 KG/M2 | RESPIRATION RATE: 17 BRPM | SYSTOLIC BLOOD PRESSURE: 111 MMHG | HEART RATE: 88 BPM | WEIGHT: 156.2 LBS

## 2023-09-18 DIAGNOSIS — Z12.4 CERVICAL CANCER SCREENING: ICD-10-CM

## 2023-09-18 DIAGNOSIS — Z00.00 ROUTINE GENERAL MEDICAL EXAMINATION AT A HEALTH CARE FACILITY: Primary | ICD-10-CM

## 2023-09-18 DIAGNOSIS — Z30.8 ENCOUNTER FOR OTHER CONTRACEPTIVE MANAGEMENT: ICD-10-CM

## 2023-09-18 DIAGNOSIS — I10 HYPERTENSION GOAL BP (BLOOD PRESSURE) < 140/90: ICD-10-CM

## 2023-09-18 PROCEDURE — 99395 PREV VISIT EST AGE 18-39: CPT | Performed by: PHYSICIAN ASSISTANT

## 2023-09-18 PROCEDURE — G0145 SCR C/V CYTO,THINLAYER,RESCR: HCPCS | Performed by: PHYSICIAN ASSISTANT

## 2023-09-18 PROCEDURE — 87624 HPV HI-RISK TYP POOLED RSLT: CPT | Performed by: PHYSICIAN ASSISTANT

## 2023-09-18 RX ORDER — HYDROCHLOROTHIAZIDE 25 MG/1
25 TABLET ORAL DAILY
Qty: 90 TABLET | Refills: 3 | Status: SHIPPED | OUTPATIENT
Start: 2023-09-18

## 2023-09-18 RX ORDER — BENAZEPRIL HYDROCHLORIDE 10 MG/1
10 TABLET ORAL DAILY
Qty: 90 TABLET | Refills: 3 | Status: SHIPPED | OUTPATIENT
Start: 2023-09-18

## 2023-09-18 RX ORDER — ACETAMINOPHEN AND CODEINE PHOSPHATE 120; 12 MG/5ML; MG/5ML
0.35 SOLUTION ORAL DAILY
Qty: 84 TABLET | Refills: 4 | Status: SHIPPED | OUTPATIENT
Start: 2023-09-18

## 2023-09-18 ASSESSMENT — ENCOUNTER SYMPTOMS
CHILLS: 0
HEMATURIA: 0
SHORTNESS OF BREATH: 0
FEVER: 0
MYALGIAS: 0
COUGH: 0
HEMATOCHEZIA: 0
FREQUENCY: 1
PARESTHESIAS: 0
DIZZINESS: 0
SORE THROAT: 0
PALPITATIONS: 0
NERVOUS/ANXIOUS: 0
HEADACHES: 0
DIARRHEA: 0
EYE PAIN: 0
DYSURIA: 0
WEAKNESS: 0
JOINT SWELLING: 0
ARTHRALGIAS: 1
CONSTIPATION: 0
HEARTBURN: 1
NAUSEA: 0
BREAST MASS: 0
ABDOMINAL PAIN: 0

## 2023-09-18 NOTE — PROGRESS NOTES
SUBJECTIVE:   CC: Lynne is an 38 year old who presents for preventive health visit.       2023     8:46 AM   Additional Questions   Roomed by kwesi stubbs       Healthy Habits:     Getting at least 3 servings of Calcium per day:  Yes    Bi-annual eye exam:  Yes    Dental care twice a year:  Yes    Sleep apnea or symptoms of sleep apnea:  None    Diet:  Low salt    Frequency of exercise:  None    Taking medications regularly:  Yes    Medication side effects:  None    Additional concerns today:  No      Today's PHQ-2 Score:       2023     8:43 AM   PHQ-2 (  [employer])   Q1: Little interest or pleasure in doing things 0   Q2: Feeling down, depressed or hopeless 0   PHQ-2 Score 0   Q1: Little interest or pleasure in doing things Not at all   Q2: Feeling down, depressed or hopeless Not at all   PHQ-2 Score 0                 [place] appointment in October. Labs planned then.         Social History     Tobacco Use    Smoking status: Never    Smokeless tobacco: Never   Substance Use Topics    Alcohol use: Yes     Comment: sometimes             2023     8:43 AM   Alcohol Use   Prescreen: >3 drinks/day or >7 drinks/week? No     Reviewed orders with patient.  Reviewed health maintenance and updated orders accordingly - Yes      Breast Cancer Screenin/25/2021    10:34 AM   Breast CA Risk Assessment (FHS-7)   Do you have a family history of breast, colon, or ovarian cancer? No / Unknown           Pertinent mammograms are reviewed under the imaging tab.    History of abnormal Pap smear: YES - updated in Problem List and Health Maintenance accordingly      Latest Ref Rng & Units 2020     8:28 AM 2020     8:19 AM 2018     8:16 AM   PAP / HPV   PAP (Historical)   NIL     HPV 16 DNA NEG^Negative Negative   Negative    HPV 18 DNA NEG^Negative Negative   Negative    Other HR HPV NEG^Negative Negative   Positive      Reviewed and updated as needed this visit by clinical staff   Tobacco  Allergies   "Meds              Reviewed and updated as needed this visit by Provider                     Review of Systems   Constitutional:  Negative for chills and fever.   HENT:  Negative for congestion, ear pain, hearing loss and sore throat.    Eyes:  Negative for pain and visual disturbance.   Respiratory:  Negative for cough and shortness of breath.    Cardiovascular:  Negative for chest pain, palpitations and peripheral edema.   Gastrointestinal:  Positive for heartburn. Negative for abdominal pain, constipation, diarrhea, hematochezia and nausea.   Breasts:  Negative for tenderness, breast mass and discharge.   Genitourinary:  Positive for frequency. Negative for dysuria, genital sores, hematuria, pelvic pain, urgency, vaginal bleeding and vaginal discharge.   Musculoskeletal:  Positive for arthralgias. Negative for joint swelling and myalgias.   Skin:  Negative for rash.   Neurological:  Negative for dizziness, weakness, headaches and paresthesias.   Psychiatric/Behavioral:  Negative for mood changes. The patient is not nervous/anxious.           OBJECTIVE:   /77 (BP Location: Left arm, Patient Position: Chair, Cuff Size: Adult Regular)   Pulse 88   Temp 99  F (37.2  C) (Oral)   Resp 17   Ht 1.595 m (5' 2.8\")   Wt 70.9 kg (156 lb 3.2 oz)   LMP 08/28/2023 (Approximate)   SpO2 100%   BMI 27.85 kg/m    Physical Exam  GENERAL: healthy, alert and no distress  EYES: Eyes grossly normal to inspection, PERRL and conjunctivae and sclerae normal  HENT: ear canals and TM's normal, nose and mouth without ulcers or lesions  NECK: no adenopathy, no asymmetry, masses, or scars and thyroid normal to palpation  RESP: lungs clear to auscultation - no rales, rhonchi or wheezes  CV: regular rate and rhythm, loud systolic murmur, no peripheral edema.   ABDOMEN: soft, nontender, no hepatosplenomegaly, no masses and    (female): normal female external genitalia, normal urethral meatus, vaginal mucosa pink, moist, well " "rugated, and normal cervix/  MS: no gross musculoskeletal defects noted, no edema  SKIN: no suspicious lesions or rashes  NEURO: Normal strength and tone, mentation intact and speech normal  PSYCH: mentation appears normal, affect normal/bright        ASSESSMENT/PLAN:   (Z00.00) Routine general medical examination at a health care facility  (primary encounter diagnosis)  Comment:   Plan:     (Z12.4) Cervical cancer screening  Comment:   Plan: Pap Screen with HPV - recommended age 30 - 65         years            (I10) Hypertension goal BP (blood pressure) < 140/90  Comment:   Plan: benazepril (LOTENSIN) 10 MG tablet,         hydrochlorothiazide (HYDRODIURIL) 25 MG tablet        Stable, managed through [facility], will get labs with them.     (Z30.8) Encounter for other contraceptive management  Comment:   Plan: norethindrone (NORLYDA) 0.35 MG tablet        Stable refilled.           COUNSELING:  Reviewed preventive health counseling, as reflected in patient instructions       Regular exercise       Healthy diet/nutrition       Contraception      BMI:   Estimated body mass index is 27.85 kg/m  as calculated from the following:    Height as of this encounter: 1.595 m (5' 2.8\").    Weight as of this encounter: 70.9 kg (156 lb 3.2 oz).   Weight management plan: Discussed healthy diet and exercise guidelines      She reports that she has never smoked. She has never used smokeless tobacco.          MERLY Jessica [facility] LORIE  "

## 2023-09-20 LAB
BKR LAB AP GYN ADEQUACY: NORMAL
BKR LAB AP GYN INTERPRETATION: NORMAL
BKR LAB AP HPV REFLEX: NORMAL
BKR LAB AP PREVIOUS ABNL DX: NORMAL
BKR LAB AP PREVIOUS ABNORMAL: NORMAL
PATH REPORT.COMMENTS IMP SPEC: NORMAL
PATH REPORT.COMMENTS IMP SPEC: NORMAL
PATH REPORT.RELEVANT HX SPEC: NORMAL

## 2023-09-22 LAB
HUMAN PAPILLOMA VIRUS 16 DNA: NEGATIVE
HUMAN PAPILLOMA VIRUS 18 DNA: NEGATIVE
HUMAN PAPILLOMA VIRUS FINAL DIAGNOSIS: NORMAL
HUMAN PAPILLOMA VIRUS OTHER HR: NEGATIVE

## 2023-10-10 DIAGNOSIS — Z30.8 ENCOUNTER FOR OTHER CONTRACEPTIVE MANAGEMENT: ICD-10-CM

## 2023-10-10 RX ORDER — ACETAMINOPHEN AND CODEINE PHOSPHATE 120; 12 MG/5ML; MG/5ML
0.35 SOLUTION ORAL DAILY
Qty: 84 TABLET | Refills: 4 | OUTPATIENT
Start: 2023-10-10

## 2024-09-26 ENCOUNTER — OFFICE VISIT (OUTPATIENT)
Dept: URGENT CARE | Facility: URGENT CARE | Age: 39
End: 2024-09-26
Payer: COMMERCIAL

## 2024-09-26 VITALS
SYSTOLIC BLOOD PRESSURE: 130 MMHG | TEMPERATURE: 98.4 F | BODY MASS INDEX: 27.39 KG/M2 | WEIGHT: 153.6 LBS | DIASTOLIC BLOOD PRESSURE: 74 MMHG | OXYGEN SATURATION: 100 % | HEART RATE: 75 BPM | RESPIRATION RATE: 16 BRPM

## 2024-09-26 DIAGNOSIS — M54.50 ACUTE LEFT-SIDED LOW BACK PAIN WITHOUT SCIATICA: Primary | ICD-10-CM

## 2024-09-26 DIAGNOSIS — R35.0 URINARY FREQUENCY: ICD-10-CM

## 2024-09-26 DIAGNOSIS — R10.30 LOWER ABDOMINAL PAIN: ICD-10-CM

## 2024-09-26 LAB
ALBUMIN UR-MCNC: NEGATIVE MG/DL
APPEARANCE UR: CLEAR
BACTERIA #/AREA URNS HPF: ABNORMAL /HPF
BILIRUB UR QL STRIP: NEGATIVE
COLOR UR AUTO: YELLOW
GLUCOSE UR STRIP-MCNC: NEGATIVE MG/DL
HGB UR QL STRIP: ABNORMAL
KETONES UR STRIP-MCNC: NEGATIVE MG/DL
LEUKOCYTE ESTERASE UR QL STRIP: NEGATIVE
NITRATE UR QL: NEGATIVE
PH UR STRIP: 7 [PH] (ref 5–7)
RBC #/AREA URNS AUTO: ABNORMAL /HPF
SP GR UR STRIP: 1.01 (ref 1–1.03)
SQUAMOUS #/AREA URNS AUTO: ABNORMAL /LPF
UROBILINOGEN UR STRIP-ACNC: 0.2 E.U./DL
WBC #/AREA URNS AUTO: ABNORMAL /HPF

## 2024-09-26 PROCEDURE — 99213 OFFICE O/P EST LOW 20 MIN: CPT | Performed by: NURSE PRACTITIONER

## 2024-09-26 PROCEDURE — 81001 URINALYSIS AUTO W/SCOPE: CPT | Performed by: NURSE PRACTITIONER

## 2024-09-26 NOTE — PROGRESS NOTES
Assessment & Plan     Urinary frequency    - UA Macroscopic with reflex to Microscopic and Culture - Lab Collect  - UA Macroscopic with reflex to Microscopic and Culture - Lab Collect  - UA Microscopic with Reflex to Culture    Lower abdominal pain      Acute left-sided low back pain without sciatica       Reviewed UA showing no sign of UTI, kidney infection. No sign of kidney stone without CVA tenderness on exam. Patient declines further evaluation in emergency room for symptoms: as discussed cannot fully evaluate abdominal pain in urgent care. Differentials include diverticulitis. Rest fluids, tylenol and ibuprofen as needed, salon pas, stretching. Back pain sounding more musculoskeletal after reaching for leaves.     Follow-up with PCP if symptoms persist for 7 days, and sooner if symptoms worsen or new symptoms develop.     Discussed red flag symptoms which warrant immediate visit in emergency room    All questions were answered and patient verbalized understanding. AVS sent via [employer].     Mary Anne Marks, JEMAL, APRN, CNP 9/26/2024 5:49 PM  [facility] [employer]    Claudette Batista is a 39 year old female who presents to clinic today for the following health issues:  Chief Complaint   Patient presents with    UTI     Frequency, lower abdominal pain, decreased appetite. Started a couple weeks ago         UTI    Onset of symptoms was a couple weeks ago.  Course of illness is worsening  Severity moderate  Current and associated symptoms urinary frequency, lower abdominal pain radiating to left low back, cloudy urine with an odor, being hot overnight  Denies fever, dysuria, hematuria, emesis, diarrhea, constipation  Treatment and measures tried tylenol helps temporarily, last had yesterday  Predisposing factors include none  Denies history of frequent UTI's, history of Pyelonephritis, diabetes, and kidney stones  Low back pain started after reaching for a bag of leaves  She has a history of  HTN, right ventricular hypertrophy and enlargement with pulmoanry valve replacement. .   No history of diverticulitis      Problem list, Medication list, Allergies, and Medical history reviewed in EPIC.    ROS:  Review of systems negative except for noted above        Objective    /74   Pulse 75   Temp 98.4  F (36.9  C) (Oral)   Resp 16   Wt 69.7 kg (153 lb 9.6 oz)   LMP 08/26/2024 (Approximate)   SpO2 100%   BMI 27.39 kg/m    Physical Exam  Constitutional:       General: She is not in acute distress.     Appearance: She is not toxic-appearing or diaphoretic.   Abdominal:      General: Bowel sounds are normal. There is no distension.      Palpations: Abdomen is soft.      Tenderness: There is abdominal tenderness in the left lower quadrant. There is no right CVA tenderness, left CVA tenderness, guarding or rebound.   Lymphadenopathy:      Cervical: No cervical adenopathy.   Skin:     General: Skin is warm and dry.   Neurological:      Mental Status: She is alert.              Labs:  Results for orders placed or performed in visit on 09/26/24   UA Macroscopic with reflex to Microscopic and Culture - Lab Collect     Status: Abnormal    Specimen: Urine, Clean Catch   Result Value Ref Range    Color Urine Yellow Colorless, Straw, Light Yellow, Yellow    Appearance Urine Clear Clear    Glucose Urine Negative Negative mg/dL    Bilirubin Urine Negative Negative    Ketones Urine Negative Negative mg/dL    Specific Gravity Urine 1.010 1.003 - 1.035    Blood Urine Small (A) Negative    pH Urine 7.0 5.0 - 7.0    Protein Albumin Urine Negative Negative mg/dL    Urobilinogen Urine 0.2 0.2, 1.0 E.U./dL    Nitrite Urine Negative Negative    Leukocyte Esterase Urine Negative Negative   UA Microscopic with Reflex to Culture     Status: Abnormal   Result Value Ref Range    Bacteria Urine Few (A) None Seen /HPF    RBC Urine 0-2 0-2 /HPF /HPF    WBC Urine 0-5 0-5 /HPF /HPF    Squamous Epithelials Urine Few (A) None Seen  /LPF    Narrative    Urine Culture not indicated

## 2024-10-11 ENCOUNTER — OFFICE VISIT (OUTPATIENT)
Dept: FAMILY MEDICINE | Facility: CLINIC | Age: 39
End: 2024-10-11
Payer: COMMERCIAL

## 2024-10-11 VITALS
WEIGHT: 153.2 LBS | SYSTOLIC BLOOD PRESSURE: 126 MMHG | TEMPERATURE: 98.2 F | DIASTOLIC BLOOD PRESSURE: 83 MMHG | HEIGHT: 64 IN | RESPIRATION RATE: 20 BRPM | HEART RATE: 86 BPM | BODY MASS INDEX: 26.15 KG/M2 | OXYGEN SATURATION: 100 %

## 2024-10-11 DIAGNOSIS — Z00.00 ROUTINE GENERAL MEDICAL EXAMINATION AT A HEALTH CARE FACILITY: Primary | ICD-10-CM

## 2024-10-11 DIAGNOSIS — M54.2 NECK PAIN: ICD-10-CM

## 2024-10-11 DIAGNOSIS — M25.562 ACUTE PAIN OF BOTH KNEES: ICD-10-CM

## 2024-10-11 DIAGNOSIS — R53.83 OTHER FATIGUE: ICD-10-CM

## 2024-10-11 DIAGNOSIS — M25.561 ACUTE PAIN OF BOTH KNEES: ICD-10-CM

## 2024-10-11 DIAGNOSIS — I10 HYPERTENSION GOAL BP (BLOOD PRESSURE) < 140/90: ICD-10-CM

## 2024-10-11 DIAGNOSIS — Z30.8 ENCOUNTER FOR OTHER CONTRACEPTIVE MANAGEMENT: ICD-10-CM

## 2024-10-11 LAB
ANION GAP SERPL CALCULATED.3IONS-SCNC: 10 MMOL/L (ref 7–15)
BUN SERPL-MCNC: 15 MG/DL (ref 6–20)
CALCIUM SERPL-MCNC: 9.9 MG/DL (ref 8.8–10.4)
CHLORIDE SERPL-SCNC: 99 MMOL/L (ref 98–107)
CREAT SERPL-MCNC: 0.65 MG/DL (ref 0.51–0.95)
EGFRCR SERPLBLD CKD-EPI 2021: >90 ML/MIN/1.73M2
ERYTHROCYTE [DISTWIDTH] IN BLOOD BY AUTOMATED COUNT: 12.7 % (ref 10–15)
FERRITIN SERPL-MCNC: 95 NG/ML (ref 6–175)
GLUCOSE SERPL-MCNC: 82 MG/DL (ref 70–99)
HCO3 SERPL-SCNC: 27 MMOL/L (ref 22–29)
HCT VFR BLD AUTO: 42.6 % (ref 35–47)
HGB BLD-MCNC: 14.4 G/DL (ref 11.7–15.7)
MAGNESIUM SERPL-MCNC: 1.9 MG/DL (ref 1.7–2.3)
MCH RBC QN AUTO: 28.2 PG (ref 26.5–33)
MCHC RBC AUTO-ENTMCNC: 33.8 G/DL (ref 31.5–36.5)
MCV RBC AUTO: 84 FL (ref 78–100)
PLATELET # BLD AUTO: 315 10E3/UL (ref 150–450)
POTASSIUM SERPL-SCNC: 3.5 MMOL/L (ref 3.4–5.3)
RBC # BLD AUTO: 5.1 10E6/UL (ref 3.8–5.2)
SODIUM SERPL-SCNC: 136 MMOL/L (ref 135–145)
TSH SERPL DL<=0.005 MIU/L-ACNC: 0.99 UIU/ML (ref 0.3–4.2)
WBC # BLD AUTO: 8.4 10E3/UL (ref 4–11)

## 2024-10-11 PROCEDURE — 84443 ASSAY THYROID STIM HORMONE: CPT | Performed by: PHYSICIAN ASSISTANT

## 2024-10-11 PROCEDURE — 99395 PREV VISIT EST AGE 18-39: CPT | Performed by: PHYSICIAN ASSISTANT

## 2024-10-11 PROCEDURE — 85027 COMPLETE CBC AUTOMATED: CPT | Performed by: PHYSICIAN ASSISTANT

## 2024-10-11 PROCEDURE — 83735 ASSAY OF MAGNESIUM: CPT | Performed by: PHYSICIAN ASSISTANT

## 2024-10-11 PROCEDURE — 36415 COLL VENOUS BLD VENIPUNCTURE: CPT | Performed by: PHYSICIAN ASSISTANT

## 2024-10-11 PROCEDURE — 82728 ASSAY OF FERRITIN: CPT | Performed by: PHYSICIAN ASSISTANT

## 2024-10-11 PROCEDURE — 80048 BASIC METABOLIC PNL TOTAL CA: CPT | Performed by: PHYSICIAN ASSISTANT

## 2024-10-11 PROCEDURE — 99214 OFFICE O/P EST MOD 30 MIN: CPT | Mod: 25 | Performed by: PHYSICIAN ASSISTANT

## 2024-10-11 RX ORDER — ACETAMINOPHEN AND CODEINE PHOSPHATE 120; 12 MG/5ML; MG/5ML
0.35 SOLUTION ORAL DAILY
Qty: 84 TABLET | Refills: 4 | Status: SHIPPED | OUTPATIENT
Start: 2024-10-11

## 2024-10-11 RX ORDER — HYDROCHLOROTHIAZIDE 25 MG/1
25 TABLET ORAL DAILY
Qty: 90 TABLET | Refills: 3 | Status: SHIPPED | OUTPATIENT
Start: 2024-10-11

## 2024-10-11 RX ORDER — BENAZEPRIL HYDROCHLORIDE 10 MG/1
10 TABLET ORAL DAILY
Qty: 90 TABLET | Refills: 3 | Status: SHIPPED | OUTPATIENT
Start: 2024-10-11

## 2024-10-11 SDOH — HEALTH STABILITY: PHYSICAL HEALTH: ON AVERAGE, HOW MANY DAYS PER WEEK DO YOU ENGAGE IN MODERATE TO STRENUOUS EXERCISE (LIKE A BRISK WALK)?: 0 DAYS

## 2024-10-11 ASSESSMENT — SOCIAL DETERMINANTS OF HEALTH (SDOH): HOW OFTEN DO YOU GET TOGETHER WITH FRIENDS OR RELATIVES?: PATIENT DECLINED

## 2024-10-11 NOTE — PROGRESS NOTES
"Preventive Care Visit  [facility] LORIE Dias PA-C, Family Medicine  Oct 11, 2024      Assessment & Plan     Routine general medical examination at a health care facility    Hypertension goal BP (blood pressure) < 140/90  Stable. Managed by cardiology, but refills given today. BMP  - BASIC METABOLIC PANEL; Future  - benazepril (LOTENSIN) 10 MG tablet; Take 1 tablet (10 mg) by mouth daily.  - hydrochlorothiazide (HYDRODIURIL) 25 MG tablet; Take 1 tablet (25 mg) by mouth daily.  - BASIC METABOLIC PANEL    Encounter for other contraceptive management  Happy with meds.   - norethindrone (NORLYDA) 0.35 MG tablet; Take 1 tablet (0.35 mg) by mouth daily.    Other fatigue  New, could be multifactorial, will get basic labs. Bring up to cardiology at upcoming appointment as well.   - CBC with platelets; Future  - TSH WITH FREE T4 REFLEX; Future  - Ferritin; Future  - Magnesium; Future  - CBC with platelets  - TSH WITH FREE T4 REFLEX  - Ferritin  - Magnesium    Neck pain  Would benefit from physical therapy to start, try to limit NSAIDS  - Physical Therapy  Referral; Future    Acute pain of both knees  Would benefit from physical therapy to start, try to limit NSAIDS.   - Physical Therapy  Referral; Future            BMI  Estimated body mass index is 26.71 kg/m  as calculated from the following:    Height as of this encounter: 1.613 m (5' 3.5\").    Weight as of this encounter: 69.5 kg (153 lb 3.2 oz).       Counseling  Appropriate preventive services were addressed with this patient via screening, questionnaire, or discussion as appropriate for fall prevention, nutrition, physical activity, Tobacco-use cessation, social engagement, weight loss and cognition.  Checklist reviewing preventive services available has been given to the patient.  Reviewed patient's diet, addressing concerns and/or questions.           Claudette Batista is a 39 year old, presenting for a routine physical " exam. Overall she is doing well, with the exception of increased fatigue and cervical and left knee pain. She feels like she can't do all the things she wants to do. Has noticed increased fatigue over the past several months. Admits increased stress with several family members going through various illness. Has no problems falling asleep and staying asleep, however does not feel rested when she wakes up in the morning. Wonders if she is restless at night. Feels her mood is okay. 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    Complains of chronic L > R knee pain and notes tenderness over the lateral aspect of the left knee. The pain limits her from engaging in physical activity. Not currently exercising on a consistent basis but does get some physical activity at work. Also has cervical pain with associated headaches, sometimes with aura, and radiation to the right arm. Pain improves when her  massages the area. Taking Tylenol or Ibuprofen most nights of the week with benefit. Has done physical therapy in the past for the knees and carpal tunnel syndrome (resolved) with improvement, and would be open to trailing this again. Denies back pain. Notes occasional right hip pain.    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 Next appointment is scheduled for 11/2024.     Notes that heartburn has worsened in recent months. Eliminated coffee from her diet and noticed improvement.     Noticing breast tenderness in the premenstrual period. Continues Norlyda oral contraception and denies other associated symptoms during the premenstrual period.    Physical and Health Maintenance        10/11/2024    10:42 AM   Additional Questions   Roomed by kwesi stubbs        Health Care Directive  Patient does not have a Health Care Directive or Living Will: Discussed advance care planning with patient; however, patient declined at this  time.    HPI              10/11/2024   General Health   How would you rate your overall physical health? Good   Feel stress (tense, anxious, or unable to sleep) To some extent      (!) STRESS CONCERN      10/11/2024   Nutrition   Three or more servings of calcium each day? Yes   Diet: Low salt   How many servings of fruit and vegetables per day? (!) 2-3   How many sweetened beverages each day? 0-1            10/11/2024   Exercise   Days per week of moderate/strenous exercise 0 days      (!) EXERCISE CONCERN      10/11/2024   Social Factors   Frequency of gathering with friends or relatives Patient declined   Worry food won't last until get money to buy more No   Food not last or not have enough money for food? No   Do you have housing? (Housing is defined as stable permanent housing and does not include staying ouside in a car, in a tent, in an abandoned building, in an overnight shelter, or couch-surfing.) Patient declined   Are you worried about losing your housing? Patient declined   Lack of transportation? Patient declined   Unable to get utilities (heat,electricity)? Patient declined            10/11/2024   Dental   Dentist two times every year? Yes            10/11/2024   TB Screening   Were you born outside of the [place]? No            Today's PHQ-2 Score:       10/11/2024    10:35 AM   PHQ-2 ( 1999 [employer])   Q1: Little interest or pleasure in doing things 0   Q2: Feeling down, depressed or hopeless 0   PHQ-2 Score 0   Q1: Little interest or pleasure in doing things Not at all   Q2: Feeling down, depressed or hopeless Not at all   PHQ-2 Score 0           10/11/2024   Substance Use   Alcohol more than 3/day or more than 7/wk Not Applicable   Do you use any other substances recreationally? No        Social History     Tobacco Use    Smoking status: Never     Passive exposure: Never    Smokeless tobacco: Never   Vaping Use    Vaping status: Never Used   Substance Use Topics    Alcohol use: Yes     Comment: sometimes     Drug use: No                  10/11/2024   STI Screening   New sexual partner(s) since last STI/HIV test? No        History of abnormal Pap smear: No - age 30- 64 PAP with HPV every 5 years recommended        Latest Ref Rng & Units 9/18/2023     8:59 AM 2/7/2020     8:28 AM 2/7/2020     8:19 AM   PAP / HPV   PAP  Negative for Intraepithelial Lesion or Malignancy (NILM)      PAP (Historical)    NIL    HPV 16 DNA Negative Negative  Negative     HPV 18 DNA Negative Negative  Negative     Other HR HPV Negative Negative  Negative             10/11/2024   Contraception/Family Planning   Questions about contraception or family planning No           Reviewed and updated as needed this visit by Provider   Tobacco  Allergies  Meds  Problems  Med Hx  Surg Hx  Fam Hx                  Review of Systems  CONSTITUTIONAL: +Fatigue. NEGATIVE for fever, chills, change in weight  INTEGUMENTARY/SKIN: NEGATIVE for worrisome rashes, moles or lesions  EYES: NEGATIVE for vision changes or irritation  ENT/MOUTH: NEGATIVE for ear, mouth and throat problems  RESP: NEGATIVE for significant cough or SOB  BREAST: +Breast tenderness. NEGATIVE for masses or discharge  CV: NEGATIVE for chest pain, palpitations or peripheral edema  GI: +Heartburn. NEGATIVE for nausea, abdominal pain, or change in bowel habits  : normal menstrual cycles. NEGATIVE: dysuria, frequency, hematuria, incontinence, retention, urgency, vaginal discharge, and bleeding  MUSCULOSKELETAL: + L>R knee pain, cervical neck pain with radiation down right. NEGATIVE for other significant arthralgias or myalgia  NEURO: NEGATIVE for weakness, dizziness, or paresthesias  ENDOCRINE: +Feeling warmer than usual. NEGATIVE for skin/hair changes  PSYCHIATRIC: NEGATIVE for changes in mood or affect     Objective    Exam  /83 (BP Location: Left arm, Patient Position: Chair, Cuff Size: Adult Regular)   Pulse 86   Temp 98.2  F (36.8  C) (Temporal)   Resp 20   Ht 1.613 m (5'  "3.5\")   Wt 69.5 kg (153 lb 3.2 oz)   LMP 10/06/2024 (Approximate)   SpO2 100%   BMI 26.71 kg/m     Estimated body mass index is 26.71 kg/m  as calculated from the following:    Height as of this encounter: 1.613 m (5' 3.5\").    Weight as of this encounter: 69.5 kg (153 lb 3.2 oz).    Physical Exam  GENERAL: alert and no distress  EYES: Eyes grossly normal to inspection, PERRL and conjunctivae and sclerae normal  HENT: ear canals and TM's normal, nose and mouth without ulcers or lesions  NECK: no adenopathy, no asymmetry, masses, or scars  RESP: lungs clear to auscultation - no rales, rhonchi or wheezes  CV: regular rate and rhythm. Baseline harsh murmur with end click. Grade 3/6.  ABDOMEN: soft, nontender, no hepatosplenomegaly, no masses  MS: no gross musculoskeletal defects noted, no edema  ORTHO: Knee Exam: Inspection: No effusion, No quad atrophy  Tender: Right MCL, left LCL, right lateral joint line, left medial joint line, left pes anserine bursa  Active Range of Motion: full flexion, no pain with flexion, full extension, no pain with extension  Strength: quad  5/5, good, Hamstrings  5/5, good, Gastroc  5/5, good, and Tibialis anterior  5/5, good  Cervical Spine Exam: Inspection: normal cervical lordosis; Tender: mild tenderness through trapezius and cervical paraspinal muscles bilaterally  SKIN: no suspicious lesions or rashes  NEURO: Normal strength and tone, mentation intact and speech normal  PSYCH: mentation appears normal, affect normal/bright        Signed Electronically by: MERLY Jessica PA-S  The student JODY Hewitt acted as a scribe and the encounter documented above was completely performed by myself and the documentation reflects the work I have performed today.   Mel Dias PA-C       "

## 2024-10-11 NOTE — PATIENT INSTRUCTIONS
Patient Education   Preventive Care Advice   This is general advice given by our system to help you stay healthy. However, your care team may have specific advice just for you. Please talk to your care team about your preventive care needs.  Nutrition  Eat 5 or more servings of fruits and vegetables each day.  Try wheat bread, brown rice and whole grain pasta (instead of white bread, rice, and pasta).  Get enough calcium and vitamin D. Check the label on foods and aim for 100% of the RDA (recommended daily allowance).  Lifestyle  Exercise at least 150 minutes each week  (30 minutes a day, 5 days a week).  Do muscle strengthening activities 2 days a week. These help control your weight and prevent disease.  No smoking.  Wear sunscreen to prevent skin cancer.  Have a dental exam and cleaning every 6 months.  Yearly exams  See your health care team every year to talk about:  Any changes in your health.  Any medicines your care team has prescribed.  Preventive care, family planning, and ways to prevent chronic diseases.  Shots (vaccines)   HPV shots (up to age 26), if you've never had them before.  Hepatitis B shots (up to age 59), if you've never had them before.  COVID-19 shot: Get this shot when it's due.  Flu shot: Get a flu shot every year.  Tetanus shot: Get a tetanus shot every 10 years.  Pneumococcal, hepatitis A, and RSV shots: Ask your care team if you need these based on your risk.  Shingles shot (for age 50 and up)  General health tests  Diabetes screening:  Starting at age 35, Get screened for diabetes at least every 3 years.  If you are younger than age 35, ask your care team if you should be screened for diabetes.  Cholesterol test: At age 39, start having a cholesterol test every 5 years, or more often if advised.  Bone density scan (DEXA): At age 50, ask your care team if you should have this scan for osteoporosis (brittle bones).  Hepatitis C: Get tested at least once in your life.  STIs (sexually  transmitted infections)  Before age 24: Ask your care team if you should be screened for STIs.  After age 24: Get screened for STIs if you're at risk. You are at risk for STIs (including HIV) if:  You are sexually active with more than one person.  You don't use condoms every time.  You or a partner was diagnosed with a sexually transmitted infection.  If you are at risk for HIV, ask about PrEP medicine to prevent HIV.  Get tested for HIV at least once in your life, whether you are at risk for HIV or not.  Cancer screening tests  Cervical cancer screening: If you have a cervix, begin getting regular cervical cancer screening tests starting at age 21.  Breast cancer scan (mammogram): If you've ever had breasts, begin having regular mammograms starting at age 40. This is a scan to check for breast cancer.  Colon cancer screening: It is important to start screening for colon cancer at age 45.  Have a colonoscopy test every 10 years (or more often if you're at risk) Or, ask your provider about stool tests like a FIT test every year or Cologuard test every 3 years.  To learn more about your testing options, visit:   .  For help making a decision, visit:   https://bit.ly/so03701.  Prostate cancer screening test: If you have a prostate, ask your care team if a prostate cancer screening test (PSA) at age 55 is right for you.  Lung cancer screening: If you are a current or former smoker ages 50 to 80, ask your care team if ongoing lung cancer screenings are right for you.  For informational purposes only. Not to replace the advice of your health care provider. Copyright   2023 [employer]. All rights reserved. Clinically reviewed by the [facility] Transitions Program. [employer] 833938 - REV 01/24.

## 2024-12-10 DIAGNOSIS — I10 HYPERTENSION GOAL BP (BLOOD PRESSURE) < 140/90: ICD-10-CM

## 2024-12-10 RX ORDER — BENAZEPRIL HYDROCHLORIDE 10 MG/1
10 TABLET ORAL DAILY
Qty: 90 TABLET | Refills: 3 | Status: SHIPPED | OUTPATIENT
Start: 2024-12-10